# Patient Record
Sex: FEMALE | Race: WHITE | URBAN - NONMETROPOLITAN AREA
[De-identification: names, ages, dates, MRNs, and addresses within clinical notes are randomized per-mention and may not be internally consistent; named-entity substitution may affect disease eponyms.]

---

## 2022-08-14 ENCOUNTER — HOSPITAL ENCOUNTER (EMERGENCY)
Age: 37
Discharge: HOME | End: 2022-08-14
Attending: EMERGENCY MEDICINE
Payer: COMMERCIAL

## 2022-08-14 ENCOUNTER — APPOINTMENT (OUTPATIENT)
Dept: CT IMAGING | Age: 37
End: 2022-08-14
Payer: COMMERCIAL

## 2022-08-14 VITALS
BODY MASS INDEX: 32.78 KG/M2 | TEMPERATURE: 96.8 F | WEIGHT: 192 LBS | SYSTOLIC BLOOD PRESSURE: 145 MMHG | DIASTOLIC BLOOD PRESSURE: 75 MMHG | HEART RATE: 95 BPM | RESPIRATION RATE: 20 BRPM | HEIGHT: 64 IN | OXYGEN SATURATION: 98 %

## 2022-08-14 DIAGNOSIS — N93.9 ABNORMAL VAGINAL BLEEDING: ICD-10-CM

## 2022-08-14 DIAGNOSIS — N93.9 VAGINAL BLEEDING: Primary | ICD-10-CM

## 2022-08-14 LAB
ALBUMIN SERPL-MCNC: 4.5 GM/DL (ref 3.4–5)
ALP BLD-CCNC: 94 IU/L (ref 40–129)
ALT SERPL-CCNC: 19 U/L (ref 10–40)
ANION GAP SERPL CALCULATED.3IONS-SCNC: 11 MMOL/L (ref 4–16)
AST SERPL-CCNC: 19 IU/L (ref 15–37)
BACTERIA: NEGATIVE /HPF
BASOPHILS ABSOLUTE: 0 K/CU MM
BASOPHILS RELATIVE PERCENT: 0.3 % (ref 0–1)
BILIRUB SERPL-MCNC: 0.4 MG/DL (ref 0–1)
BILIRUBIN URINE: NEGATIVE MG/DL
BLOOD, URINE: ABNORMAL
BUN BLDV-MCNC: 13 MG/DL (ref 6–23)
CALCIUM SERPL-MCNC: 8.8 MG/DL (ref 8.3–10.6)
CAST TYPE: ABNORMAL /HPF
CHLORIDE BLD-SCNC: 105 MMOL/L (ref 99–110)
CLARITY: CLEAR
CO2: 23 MMOL/L (ref 21–32)
COLOR: YELLOW
CREAT SERPL-MCNC: 0.7 MG/DL (ref 0.6–1.1)
CRYSTAL TYPE: NEGATIVE /HPF
DIFFERENTIAL TYPE: ABNORMAL
EOSINOPHILS ABSOLUTE: 0.3 K/CU MM
EOSINOPHILS RELATIVE PERCENT: 2.4 % (ref 0–3)
EPITHELIAL CELLS, UA: 3 /HPF
GFR AFRICAN AMERICAN: >60 ML/MIN/1.73M2
GFR NON-AFRICAN AMERICAN: >60 ML/MIN/1.73M2
GLUCOSE BLD-MCNC: 141 MG/DL (ref 70–99)
GLUCOSE, URINE: NEGATIVE MG/DL
HCT VFR BLD CALC: 39.4 % (ref 37–47)
HEMOGLOBIN: 13.5 GM/DL (ref 12.5–16)
IMMATURE NEUTROPHIL %: 0.4 % (ref 0–0.43)
INR BLD: 1.01 INDEX
INTERPRETATION: NORMAL
KETONES, URINE: NEGATIVE MG/DL
LEUKOCYTE ESTERASE, URINE: NEGATIVE
LYMPHOCYTES ABSOLUTE: 1.9 K/CU MM
LYMPHOCYTES RELATIVE PERCENT: 16.6 % (ref 24–44)
MCH RBC QN AUTO: 29.2 PG (ref 27–31)
MCHC RBC AUTO-ENTMCNC: 34.3 % (ref 32–36)
MCV RBC AUTO: 85.1 FL (ref 78–100)
MONOCYTES ABSOLUTE: 0.8 K/CU MM
MONOCYTES RELATIVE PERCENT: 6.7 % (ref 0–4)
NITRITE URINE, QUANTITATIVE: NEGATIVE
PDW BLD-RTO: 12.5 % (ref 11.7–14.9)
PH, URINE: 5.5 (ref 5–8)
PLATELET # BLD: 411 K/CU MM (ref 140–440)
PMV BLD AUTO: 9.6 FL (ref 7.5–11.1)
POTASSIUM SERPL-SCNC: 4.1 MMOL/L (ref 3.5–5.1)
PREGNANCY, URINE: NEGATIVE
PROTEIN UA: NEGATIVE MG/DL
PROTHROMBIN TIME: 12.2 SECONDS (ref 11.7–14.5)
RBC # BLD: 4.63 M/CU MM (ref 4.2–5.4)
RBC URINE: 25 /HPF (ref 0–6)
SEGMENTED NEUTROPHILS ABSOLUTE COUNT: 8.3 K/CU MM
SEGMENTED NEUTROPHILS RELATIVE PERCENT: 73.6 % (ref 36–66)
SODIUM BLD-SCNC: 139 MMOL/L (ref 135–145)
SPECIFIC GRAVITY UA: >1.03 (ref 1–1.03)
SPECIFIC GRAVITY, URINE: >1.03 (ref 1–1.03)
TOTAL IMMATURE NEUTOROPHIL: 0.04 K/CU MM
TOTAL PROTEIN: 7.9 GM/DL (ref 6.4–8.2)
TSH HIGH SENSITIVITY: 0.62 UIU/ML (ref 0.27–4.2)
UROBILINOGEN, URINE: 0.2 MG/DL (ref 0.2–1)
WBC # BLD: 11.3 K/CU MM (ref 4–10.5)
WBC UA: NEGATIVE /HPF (ref 0–5)

## 2022-08-14 PROCEDURE — 6360000004 HC RX CONTRAST MEDICATION: Performed by: EMERGENCY MEDICINE

## 2022-08-14 PROCEDURE — 96374 THER/PROPH/DIAG INJ IV PUSH: CPT

## 2022-08-14 PROCEDURE — 85025 COMPLETE CBC W/AUTO DIFF WBC: CPT

## 2022-08-14 PROCEDURE — 6370000000 HC RX 637 (ALT 250 FOR IP): Performed by: EMERGENCY MEDICINE

## 2022-08-14 PROCEDURE — 81001 URINALYSIS AUTO W/SCOPE: CPT

## 2022-08-14 PROCEDURE — 81025 URINE PREGNANCY TEST: CPT

## 2022-08-14 PROCEDURE — 80053 COMPREHEN METABOLIC PANEL: CPT

## 2022-08-14 PROCEDURE — 74177 CT ABD & PELVIS W/CONTRAST: CPT

## 2022-08-14 PROCEDURE — 99285 EMERGENCY DEPT VISIT HI MDM: CPT

## 2022-08-14 PROCEDURE — 6360000002 HC RX W HCPCS: Performed by: EMERGENCY MEDICINE

## 2022-08-14 PROCEDURE — 84443 ASSAY THYROID STIM HORMONE: CPT

## 2022-08-14 PROCEDURE — 85610 PROTHROMBIN TIME: CPT

## 2022-08-14 RX ORDER — ONDANSETRON 2 MG/ML
4 INJECTION INTRAMUSCULAR; INTRAVENOUS ONCE
Status: COMPLETED | OUTPATIENT
Start: 2022-08-14 | End: 2022-08-14

## 2022-08-14 RX ORDER — TRANEXAMIC ACID 650 1/1
1300 TABLET ORAL 3 TIMES DAILY
Qty: 30 TABLET | Refills: 0 | Status: SHIPPED | OUTPATIENT
Start: 2022-08-14 | End: 2022-09-15

## 2022-08-14 RX ORDER — ONDANSETRON 4 MG/1
4 TABLET, FILM COATED ORAL 3 TIMES DAILY PRN
Qty: 15 TABLET | Refills: 0 | Status: SHIPPED | OUTPATIENT
Start: 2022-08-14

## 2022-08-14 RX ORDER — TRANEXAMIC ACID 650 1/1
1300 TABLET ORAL ONCE
Status: COMPLETED | OUTPATIENT
Start: 2022-08-14 | End: 2022-08-14

## 2022-08-14 RX ADMIN — TRANEXAMIC ACID 1300 MG: 650 TABLET ORAL at 13:56

## 2022-08-14 RX ADMIN — IOPAMIDOL 100 ML: 755 INJECTION, SOLUTION INTRAVENOUS at 12:09

## 2022-08-14 RX ADMIN — ONDANSETRON 4 MG: 2 INJECTION INTRAMUSCULAR; INTRAVENOUS at 11:13

## 2022-08-14 ASSESSMENT — PAIN DESCRIPTION - DESCRIPTORS: DESCRIPTORS: DISCOMFORT;CRAMPING

## 2022-08-14 ASSESSMENT — LIFESTYLE VARIABLES
HOW OFTEN DO YOU HAVE A DRINK CONTAINING ALCOHOL: NEVER
HOW MANY STANDARD DRINKS CONTAINING ALCOHOL DO YOU HAVE ON A TYPICAL DAY: PATIENT DOES NOT DRINK

## 2022-08-14 ASSESSMENT — PAIN DESCRIPTION - LOCATION: LOCATION: ABDOMEN

## 2022-08-14 ASSESSMENT — PAIN SCALES - GENERAL: PAINLEVEL_OUTOF10: 10

## 2022-08-14 ASSESSMENT — PAIN DESCRIPTION - ORIENTATION: ORIENTATION: LOWER;MID

## 2022-08-14 ASSESSMENT — PAIN - FUNCTIONAL ASSESSMENT: PAIN_FUNCTIONAL_ASSESSMENT: 0-10

## 2022-08-14 ASSESSMENT — PAIN DESCRIPTION - PAIN TYPE: TYPE: ACUTE PAIN

## 2022-08-14 NOTE — ED NOTES
Discharge instructions reviewed with patient and patients . Reviewed prescriptions with patient and patients . No additional questions asked. Voiced understanding. Encouraged patient to follow up as discussed by the ED physician.      Willie Hurtado RN  08/14/22 7705

## 2022-08-14 NOTE — Clinical Note
Jamila Iyer was seen and treated in our emergency department on 8/14/2022. She may return to work on 08/16/2022. If you have any questions or concerns, please don't hesitate to call.       Mariama Orta MD

## 2022-08-14 NOTE — ED PROVIDER NOTES
Emergency [address] [facility] EMERGENCY DEPARTMENT    Patient: Jamison Pruitt  MRN: [id number]  : 1985  Date of Evaluation: 2022  ED Provider: Michael Loera MD    Chief Complaint       Chief Complaint   Patient presents with    Vaginal Bleeding     C/o heavy vaginal bleeding for 5 days w/ abd cramps. Pt has had abnormal periods for a year     Susanna Henriquez is a 31 y.o. female who presents to the emergency department for evaluation of abnormal uterine bleeding. Patient reports that she has been having abnormal periods for the past several years but worsened over the past 2 years. She says that it intermittently goes for very light menstrual cycles to very heavy menstrual cycles that require changing of the pad or tampon every hour. Patient states that heavy menstrual cycles are associated with significant mount of cramping and discomfort. Today she says she also felt nauseated and lightheaded and became concerned. Her last menstrual cycle started several days ago. Does report that she is been seen and evaluated by multiple physicians and OB/GYN's for the same. She states that she is frequently placed on birth control medications for her symptoms but states that they have not seem to regulate her menstrual cycles. Patient does report that they have checked her thyroid as well as other laboratory test with no clear-cut etiology of her symptoms as of yet. She states that she feels that oral hormonal medications do not seem to be working so she states that she has not currently taking any at this time. Does report that she has been taking Midol, ibuprofen, Tylenol for her menstrual cramping. Denies having history of having a bleeding disorder although she says that she does tend to bleed easily even with intramuscular injections or minor cuts. No reported family history of bleeding disorders of which she is aware.   Denies taking any other medications on a regular basis. No abdominal surgeries or trauma. Patient reports that she had last seen an OB/GYN in [facility] and does not have a local physician. ROS:     At least 10 systems reviewed and otherwise acutely negative except as in the [address] Past History   History reviewed. No pertinent past medical history. History reviewed. No pertinent surgical history. Social History     Socioeconomic History    Marital status:      Spouse name: None    Number of children: None    Years of education: None    Highest education level: None   Tobacco Use    Smoking status: Never    Smokeless tobacco: Never   Vaping Use    Vaping Use: Never used   Substance and Sexual Activity    Alcohol use: Not Currently    Sexual activity: Yes     Partners: Male       Medications/Allergies     Previous Medications    No medications on file     Allergies   Allergen Reactions    Pcn [Penicillins] Rash        Physical Exam       ED Triage Vitals [08/14/22 1044]   BP Temp Temp Source Heart Rate Resp SpO2 Height Weight   (!) 145/75 96.8 °F (36 °C) Oral 95 20 98 % 5' 4\" (1.626 m) 192 lb (87.1 kg)     GENERAL APPEARANCE: Awake and alert. Cooperative. No acute distress. HEAD: Normocephalic. Atraumatic. EYES: Sclera anicteric. Pupils equal round reactive to light extraocular movements are intact  ENT: Tolerates saliva. No trismus. Moist mucous membranes  NECK: Supple. Trachea midline. No meningismus  CARDIO: RRR. Radial pulse 2+. No murmurs rubs or gallops appreciated  LUNGS: Respirations unlabored. CTAB. No accessory muscle usage noted. No wheezes rales rhonchi or stridor. ABDOMEN: Soft. Non-distended. Non-tender. No tenderness in right upper quadrant or right lower quadrant to deep palpation  EXTREMITIES: No acute deformities. No unilateral leg swelling or tenderness behind either one of calves  SKIN: Warm and dry. No erythema edema or rashes appreciated  NEUROLOGICAL:  Cranial nerves II through XII grossly intact.  No gross facial drooping. Moves all 4 extremities spontaneously. PSYCHIATRIC: Normal mood. Alert and oriented x3. No reported active suicidality or homicidality.     Diagnostics   Labs:  Results for orders placed or performed during the hospital encounter of 08/14/22   CBC with Auto Differential   Result Value Ref Range    WBC 11.3 (H) 4.0 - 10.5 K/CU MM    RBC 4.63 4.2 - 5.4 M/CU MM    Hemoglobin 13.5 12.5 - 16.0 GM/DL    Hematocrit 39.4 37 - 47 %    MCV 85.1 78 - 100 FL    MCH 29.2 27 - 31 PG    MCHC 34.3 32.0 - 36.0 %    RDW 12.5 11.7 - 14.9 %    Platelets 782 453 - 437 K/CU MM    MPV 9.6 7.5 - 11.1 FL    Differential Type AUTOMATED DIFFERENTIAL     Segs Relative 73.6 (H) 36 - 66 %    Lymphocytes % 16.6 (L) 24 - 44 %    Monocytes % 6.7 (H) 0 - 4 %    Eosinophils % 2.4 0 - 3 %    Basophils % 0.3 0 - 1 %    Segs Absolute 8.3 K/CU MM    Lymphocytes Absolute 1.9 K/CU MM    Monocytes Absolute 0.8 K/CU MM    Eosinophils Absolute 0.3 K/CU MM    Basophils Absolute 0.0 K/CU MM    Immature Neutrophil % 0.4 0 - 0.43 %    Total Immature Neutrophil 0.04 K/CU MM   Comprehensive Metabolic Panel w/ Reflex to MG   Result Value Ref Range    Sodium 139 135 - 145 MMOL/L    Potassium 4.1 3.5 - 5.1 MMOL/L    Chloride 105 99 - 110 mMol/L    CO2 23 21 - 32 MMOL/L    BUN 13 6 - 23 MG/DL    Creatinine 0.7 0.6 - 1.1 MG/DL    Glucose 141 (H) 70 - 99 MG/DL    Calcium 8.8 8.3 - 10.6 MG/DL    Albumin 4.5 3.4 - 5.0 GM/DL    Total Protein 7.9 6.4 - 8.2 GM/DL    Total Bilirubin 0.4 0.0 - 1.0 MG/DL    ALT 19 10 - 40 U/L    AST 19 15 - 37 IU/L    Alkaline Phosphatase 94 40 - 129 IU/L    GFR Non-African American >60 >60 mL/min/1.73m2    GFR African American >60 >60 mL/min/1.73m2    Anion Gap 11 4 - 16   Protime-INR   Result Value Ref Range    Protime 12.2 11.7 - 14.5 SECONDS    INR 1.01 INDEX   Urinalysis with Microscopic   Result Value Ref Range    Color, UA YELLOW YELLOW    Clarity, UA CLEAR CLEAR    Glucose, Urine NEGATIVE NEGATIVE MG/DL    Bilirubin Urine NEGATIVE NEGATIVE MG/DL    Ketones, Urine NEGATIVE NEGATIVE MG/DL    Specific Gravity, UA >1.030 1.001 - 1.035    Blood, Urine LARGE NUMBER OR AMOUNT OF  (A) NEGATIVE    pH, Urine 5.5 5.0 - 8.0    Protein, UA NEGATIVE NEGATIVE MG/DL    Urobilinogen, Urine 0.2 0.2 - 1.0 MG/DL    Nitrite Urine, Quantitative NEGATIVE NEGATIVE    Leukocyte Esterase, Urine NEGATIVE NEGATIVE    RBC, UA 25 (H) 0 - 6 /HPF    WBC, UA NEGATIVE 0 - 5 /HPF    Epithelial Cells, UA 3 /HPF    Cast Type NO CAST FORMS SEEN NO CAST FORMS SEEN /HPF    Bacteria, UA NEGATIVE NEGATIVE /HPF    Crystal Type NEGATIVE NEGATIVE /HPF   Pregnancy, Urine   Result Value Ref Range    Pregnancy, Urine NEGATIVE NEGATIVE    Specific Gravity, Urine >1.030 1.001 - 1.035    Interpretation HCG METHOD LIMITATIONS:    TSH   Result Value Ref Range    TSH, High Sensitivity 0.620 0.270 - 4.20 uIu/ml     Radiographs:  CT ABDOMEN PELVIS W IV CONTRAST Additional Contrast? None    Result Date: 8/14/2022  1. No acute abdominal process. 2. The pelvis is normal with normal appearance of the uterus and ovaries. Procedures/EKG:       ED Course and MDM   In brief, Jada Curry is a 31 y.o. female who presented to the emergency department for evaluation of abnormal uterine bleeding. Based on patient's history and physical would be concern for possible underlying bleeding disorder of the possibility patient symptoms do include uterine fibroids. Patient denies unexplained weight loss or weight gain making cancers less likely. No reported history of early gynecologic cancers of which she is aware. Patient denies any vaginal discharge or bleeding between cycles fevers or new sexual partners. Do believe that pelvic inflammatory disease or other infectious etiology less likely as well. On reevaluation patient is resting much more comfortably at this time. Does report that her nausea is completely resolved. She is feeling better after pharmacologic intervention.   I discussed the findings of the laboratory work with her and advised her that we were going to obtain a CT scan to look for any gross structural abnormalities. Does report that her mother did have a hysterectomy for cancer but that was performed when her mother was in her 62s. I did review patient's imaging studies and laboratory work as noted above. I discussed the case with Dr. Sawyer Cartagena, OB/GYN. He recommended giving the patient 25 mg of Premarin as well as 1300 mg of TXA 2 tablets 3 times a day for 5 days. Requested the patient call his office in the morning  Patient will be seen as soon as possible. These recommendations were discussed with the patient. She is very happy to hear this and appreciative of our efforts. She does feel comfortable to be discharged home    Unfortunately Premarin is not available at this facility. I did offer to have this medication transferred from [facility]. Patient was advised of the time that it might take to get this medication and she says that she would prefer not to wait and will to start TXA at this time and prefers to go home. She will call OB/GYN first thing tomorrow morning. ED Medication Orders (From admission, onward)      Start Ordered     Status Ordering Provider    08/14/22 1345 08/14/22 1335  tranexamic acid (LYSTEDA) tablet 1,300 mg  ONCE         Acknowledged ANYI MUELLER    08/14/22 1315 08/14/22 1310  conjugated estrogens (PREMARIN) injection 25 mg  ONCE         Acknowledged ANYI MUELLER    08/14/22 1208 08/14/22 1208  iopamidol (ISOVUE-370) 76 % injection 100 mL  IMG ONCE PRN         Last MAR action: Given - by [facility] MARIEE on 08/14/22 at [address], ANYI    08/14/22 1100 08/14/22 1059  ondansetron (ZOFRAN) injection 4 mg  ONCE         Last MAR action: Given - by Nj Anthony on 08/14/22 at 1113 ANYI MUELLER            Final Impression      1. Vaginal bleeding    2.  Abnormal vaginal bleeding      DISPOSITION Discharge - Pending Orders Complete 08/14/2022 01:40:14 PM         (Please note that portions of this note may have been completed with a voice recognition program. Efforts were made to edit the dictations but occasionally words are mis-transcribed.)    Juan Summers MD  [facility]        Juan Summers MD  08/14/22 [facility] [place]  08/14/22 1424

## 2022-08-14 NOTE — DISCHARGE INSTRUCTIONS
Please follow-up with OB/GYN tomorrow morning. Call to schedule appointment. Drink plenty of fluids.     Return to the emergency department for increasing pain, fevers, increasing bleeding, inability tolerating by mouth, any other concerning symptoms

## 2022-08-15 NOTE — PROGRESS NOTES
Was seen in ER and ER physician called me and she needs follow up due to heavy menstrual bleeding. Please schedule this week.   This can be with myself, Erin Rdz

## 2022-09-15 ENCOUNTER — OFFICE VISIT (OUTPATIENT)
Dept: FAMILY MEDICINE CLINIC | Age: 37
End: 2022-09-15
Payer: COMMERCIAL

## 2022-09-15 VITALS
SYSTOLIC BLOOD PRESSURE: 118 MMHG | RESPIRATION RATE: 16 BRPM | TEMPERATURE: 97.7 F | WEIGHT: 203.8 LBS | HEART RATE: 94 BPM | BODY MASS INDEX: 34.98 KG/M2 | DIASTOLIC BLOOD PRESSURE: 76 MMHG | OXYGEN SATURATION: 97 %

## 2022-09-15 DIAGNOSIS — G47.9 SLEEP DIFFICULTIES: ICD-10-CM

## 2022-09-15 DIAGNOSIS — N92.6 IRREGULAR MENSES: Primary | ICD-10-CM

## 2022-09-15 PROCEDURE — G8417 CALC BMI ABV UP PARAM F/U: HCPCS | Performed by: NURSE PRACTITIONER

## 2022-09-15 PROCEDURE — G8427 DOCREV CUR MEDS BY ELIG CLIN: HCPCS | Performed by: NURSE PRACTITIONER

## 2022-09-15 PROCEDURE — 1036F TOBACCO NON-USER: CPT | Performed by: NURSE PRACTITIONER

## 2022-09-15 PROCEDURE — 99203 OFFICE O/P NEW LOW 30 MIN: CPT | Performed by: NURSE PRACTITIONER

## 2022-09-15 RX ORDER — PROGESTERONE 200 MG/1
200 CAPSULE ORAL NIGHTLY
COMMUNITY

## 2022-09-15 ASSESSMENT — ANXIETY QUESTIONNAIRES
6. BECOMING EASILY ANNOYED OR IRRITABLE: 0
3. WORRYING TOO MUCH ABOUT DIFFERENT THINGS: 1
7. FEELING AFRAID AS IF SOMETHING AWFUL MIGHT HAPPEN: 0
4. TROUBLE RELAXING: 0
5. BEING SO RESTLESS THAT IT IS HARD TO SIT STILL: 2
2. NOT BEING ABLE TO STOP OR CONTROL WORRYING: 0
1. FEELING NERVOUS, ANXIOUS, OR ON EDGE: 0
GAD7 TOTAL SCORE: 3

## 2022-09-15 ASSESSMENT — PATIENT HEALTH QUESTIONNAIRE - PHQ9
SUM OF ALL RESPONSES TO PHQ QUESTIONS 1-9: 0
SUM OF ALL RESPONSES TO PHQ QUESTIONS 1-9: 0
2. FEELING DOWN, DEPRESSED OR HOPELESS: 0
SUM OF ALL RESPONSES TO PHQ QUESTIONS 1-9: 0
SUM OF ALL RESPONSES TO PHQ QUESTIONS 1-9: 0
SUM OF ALL RESPONSES TO PHQ9 QUESTIONS 1 & 2: 0
1. LITTLE INTEREST OR PLEASURE IN DOING THINGS: 0

## 2022-09-15 ASSESSMENT — ENCOUNTER SYMPTOMS
WHEEZING: 0
NAUSEA: 0
ABDOMINAL PAIN: 0
VOMITING: 0
CONSTIPATION: 0
DIARRHEA: 0
COUGH: 0
SHORTNESS OF BREATH: 0
CHEST TIGHTNESS: 0

## 2022-09-15 NOTE — PROGRESS NOTES
SUBJECTIVE:    Elijah Moys  1985  30 y.o.  female      Chief Complaint   Patient presents with    New Patient     To establish care . Has been having weight gain no matter what she does to lose weight. She is going for labs next week was ordered by her OB /GYN     HPI    Periods became irregular a couple years ago. They were heavy at the time. She did not have a period for 6 months. Went to her OB/GYN in [place]--'put me on hormone pills'. She has since moved to the area. She saw a new OB/GYN two weeks ago. She is supposed to have labs completed. Complains of issues with weight gain. Does not feel she eats much junk. She has custody of a 3 yo and remains active. She has a [email or web address] exercise video that she couple times a week for 20 minutes. Complains of issues sleeping. Will sleep 3-4 hours per night. Not napping. Difficulty falling asleep. Able to stay asleep. 1 yo waking her in the morning. Sleeps with the TV on. She will go to bed around 10-11 pm, but not able to fall asleep until 3-4 in the morning. She has tried melatonin, but 'can't wake up on that'. She will occasionally take half a benadryl. Allergies   Allergen Reactions    Pcn [Penicillins] Anaphylaxis       Current Outpatient Medications   Medication Sig Dispense Refill    progesterone (PROMETRIUM) 200 MG CAPS capsule Take 200 mg by mouth nightly Takes the 1st of month for 14 days. ondansetron (ZOFRAN) 4 MG tablet Take 1 tablet by mouth 3 times daily as needed for Nausea or Vomiting 15 tablet 0     No current facility-administered medications for this visit.           Past Medical History:   Diagnosis Date    Anxiety      Past Surgical History:   Procedure Laterality Date    BUNIONECTOMY Right     6 years ago     Social History     Socioeconomic History    Marital status:      Spouse name: None    Number of children: None    Years of education: None    Highest education level: None   Tobacco Use    Smoking status: Never Smokeless tobacco: Never   Vaping Use    Vaping Use: Every day    Substances: Nicotine    Devices: Refillable tank    Passive vaping exposure: Yes   Substance and Sexual Activity    Alcohol use: Not Currently    Drug use: Never    Sexual activity: Yes     Partners: Male         No problem-specific Assessment & Plan notes found for this encounter. Review of Systems   Constitutional:  Negative for appetite change, chills, fatigue and unexpected weight change. Respiratory:  Negative for cough, chest tightness, shortness of breath and wheezing. Cardiovascular:  Negative for chest pain, palpitations and leg swelling. Gastrointestinal:  Negative for abdominal pain, constipation, diarrhea, nausea and vomiting. Genitourinary:  Positive for menstrual problem. Musculoskeletal:  Negative for arthralgias. Neurological:  Negative for weakness, numbness and headaches. Hematological:  Negative for adenopathy. Psychiatric/Behavioral:  Positive for sleep disturbance. The patient is not nervous/anxious. OBJECTIVE:    /76   Pulse 94   Temp 97.7 °F (36.5 °C) (Infrared)   Resp 16   Wt 203 lb 12.8 oz (92.4 kg)   LMP 08/15/2022   SpO2 97%   BMI 34.98 kg/m²     Physical Exam  Constitutional:       Appearance: Normal appearance. She is well-developed. HENT:      Head: Normocephalic and atraumatic. Right Ear: Hearing normal.      Left Ear: Hearing normal.   Neck:      Thyroid: No thyromegaly. Cardiovascular:      Rate and Rhythm: Normal rate and regular rhythm. Heart sounds: Normal heart sounds. No murmur heard. No friction rub. No gallop. Pulmonary:      Effort: Pulmonary effort is normal.      Breath sounds: Normal breath sounds. No wheezing, rhonchi or rales. Abdominal:      General: Bowel sounds are normal. There is no distension. Palpations: Abdomen is soft. Tenderness: There is no abdominal tenderness. There is no guarding.    Musculoskeletal:         General: Normal range of motion. Cervical back: Normal range of motion and neck supple. Right lower leg: No edema. Left lower leg: No edema. Skin:     General: Skin is warm and dry. Neurological:      General: No focal deficit present. Mental Status: She is alert and oriented to person, place, and time. Psychiatric:         Mood and Affect: Mood normal.         Behavior: Behavior normal. Behavior is cooperative. Thought Content: Thought content normal.       ASSESSMENT/PLAN:    1. BMI 34.0-34.9,adult  Referred to dietitian. The patient is asked to make an attempt to improve diet and exercise patterns to aid in medical management of this problem. - Amb Referral to Nutrition Services    2. Irregular menses  Advised to drop off lab results after completed for OB/GYN    3. Sleep difficulties  Discussed melatonin and dose titration. Discussed sleep hygiene and regular physical activity. Return in one month for recheck             Return in about 4 weeks (around 10/13/2022).       (Please note that portions of this note may have been completed with a voice recognition program. Efforts were made to edit the dictations but occasionally words aremis-transcribed.)

## 2022-10-19 ENCOUNTER — OFFICE VISIT (OUTPATIENT)
Dept: FAMILY MEDICINE CLINIC | Age: 37
End: 2022-10-19
Payer: COMMERCIAL

## 2022-10-19 ENCOUNTER — TELEPHONE (OUTPATIENT)
Dept: FAMILY MEDICINE CLINIC | Age: 37
End: 2022-10-19

## 2022-10-19 VITALS
HEART RATE: 92 BPM | WEIGHT: 206.8 LBS | RESPIRATION RATE: 16 BRPM | OXYGEN SATURATION: 98 % | TEMPERATURE: 97 F | SYSTOLIC BLOOD PRESSURE: 130 MMHG | BODY MASS INDEX: 35.5 KG/M2 | DIASTOLIC BLOOD PRESSURE: 80 MMHG

## 2022-10-19 DIAGNOSIS — F41.9 ANXIETY: ICD-10-CM

## 2022-10-19 DIAGNOSIS — F33.2 SEVERE EPISODE OF RECURRENT MAJOR DEPRESSIVE DISORDER, WITHOUT PSYCHOTIC FEATURES (HCC): Primary | ICD-10-CM

## 2022-10-19 DIAGNOSIS — L65.9 HAIR LOSS: ICD-10-CM

## 2022-10-19 PROCEDURE — 1036F TOBACCO NON-USER: CPT | Performed by: NURSE PRACTITIONER

## 2022-10-19 PROCEDURE — G8427 DOCREV CUR MEDS BY ELIG CLIN: HCPCS | Performed by: NURSE PRACTITIONER

## 2022-10-19 PROCEDURE — G8417 CALC BMI ABV UP PARAM F/U: HCPCS | Performed by: NURSE PRACTITIONER

## 2022-10-19 PROCEDURE — 99214 OFFICE O/P EST MOD 30 MIN: CPT | Performed by: NURSE PRACTITIONER

## 2022-10-19 PROCEDURE — G8484 FLU IMMUNIZE NO ADMIN: HCPCS | Performed by: NURSE PRACTITIONER

## 2022-10-19 RX ORDER — SERTRALINE HYDROCHLORIDE 25 MG/1
25 TABLET, FILM COATED ORAL DAILY
Qty: 30 TABLET | Refills: 3 | Status: SHIPPED | OUTPATIENT
Start: 2022-10-19

## 2022-10-19 ASSESSMENT — ANXIETY QUESTIONNAIRES
1. FEELING NERVOUS, ANXIOUS, OR ON EDGE: 3
5. BEING SO RESTLESS THAT IT IS HARD TO SIT STILL: 2
4. TROUBLE RELAXING: 1
6. BECOMING EASILY ANNOYED OR IRRITABLE: 2
GAD7 TOTAL SCORE: 13
3. WORRYING TOO MUCH ABOUT DIFFERENT THINGS: 3
7. FEELING AFRAID AS IF SOMETHING AWFUL MIGHT HAPPEN: 1
2. NOT BEING ABLE TO STOP OR CONTROL WORRYING: 1

## 2022-10-19 ASSESSMENT — PATIENT HEALTH QUESTIONNAIRE - PHQ9
SUM OF ALL RESPONSES TO PHQ9 QUESTIONS 1 & 2: 6
SUM OF ALL RESPONSES TO PHQ QUESTIONS 1-9: 21
2. FEELING DOWN, DEPRESSED OR HOPELESS: 3
10. IF YOU CHECKED OFF ANY PROBLEMS, HOW DIFFICULT HAVE THESE PROBLEMS MADE IT FOR YOU TO DO YOUR WORK, TAKE CARE OF THINGS AT HOME, OR GET ALONG WITH OTHER PEOPLE: 2
SUM OF ALL RESPONSES TO PHQ QUESTIONS 1-9: 21
7. TROUBLE CONCENTRATING ON THINGS, SUCH AS READING THE NEWSPAPER OR WATCHING TELEVISION: 0
8. MOVING OR SPEAKING SO SLOWLY THAT OTHER PEOPLE COULD HAVE NOTICED. OR THE OPPOSITE, BEING SO FIGETY OR RESTLESS THAT YOU HAVE BEEN MOVING AROUND A LOT MORE THAN USUAL: 3
5. POOR APPETITE OR OVEREATING: 3
3. TROUBLE FALLING OR STAYING ASLEEP: 3
SUM OF ALL RESPONSES TO PHQ QUESTIONS 1-9: 21
1. LITTLE INTEREST OR PLEASURE IN DOING THINGS: 3
SUM OF ALL RESPONSES TO PHQ QUESTIONS 1-9: 21
9. THOUGHTS THAT YOU WOULD BE BETTER OFF DEAD, OR OF HURTING YOURSELF: 0
6. FEELING BAD ABOUT YOURSELF - OR THAT YOU ARE A FAILURE OR HAVE LET YOURSELF OR YOUR FAMILY DOWN: 3
4. FEELING TIRED OR HAVING LITTLE ENERGY: 3

## 2022-10-19 ASSESSMENT — ENCOUNTER SYMPTOMS
SHORTNESS OF BREATH: 0
DIARRHEA: 0
ABDOMINAL PAIN: 0
COUGH: 0
WHEEZING: 0
NAUSEA: 0
CONSTIPATION: 0
VOMITING: 0
CHEST TIGHTNESS: 0

## 2022-10-19 ASSESSMENT — COLUMBIA-SUICIDE SEVERITY RATING SCALE - C-SSRS
2. HAVE YOU ACTUALLY HAD ANY THOUGHTS OF KILLING YOURSELF?: NO
1. WITHIN THE PAST MONTH, HAVE YOU WISHED YOU WERE DEAD OR WISHED YOU COULD GO TO SLEEP AND NOT WAKE UP?: NO
6. HAVE YOU EVER DONE ANYTHING, STARTED TO DO ANYTHING, OR PREPARED TO DO ANYTHING TO END YOUR LIFE?: NO

## 2022-10-19 NOTE — ASSESSMENT & PLAN NOTE
Concerned with weight. 'take walks'. Not currently walking in the last week. Does not eat out frequently. Cooking at home for the most part. Admits to snacking. Admits to eating late at night.

## 2022-10-19 NOTE — TELEPHONE ENCOUNTER
Called Dr. Niko Gutierrez OB/GYN office and spoke with .  Puma Abel that Patient had labs completed by their office and her PCP Abhijeet FLORES would like copies faxed to this office for patients chart / review.   Dr. Renato Martinez  office to fax lab results to this office [phone]

## 2022-10-19 NOTE — ASSESSMENT & PLAN NOTE
Complains of thinning hair over the last few years. She has tried vitamins, shampoos, rogaine. She has not seen dermatology. She had labs completed by OB/GYN that she is not able to view during appointment.

## 2022-10-19 NOTE — ASSESSMENT & PLAN NOTE
Depression worsening over the last couple months. Temporary custody of 1year old. Possibility of full custody in December. Not sleeping well--will sleep late into the morning after not falling asleep around 2-3 am. Denies SI/HI. She has been on prozac in the past for depression.    PHQ Scores 10/19/2022 9/15/2022   PHQ2 Score 6 0   PHQ9 Score 21 0     Interpretation of Total Score Depression Severity: 1-4 = Minimal depression, 5-9 = Mild depression, 10-14 = Moderate depression, 15-19 = Moderately severe depression, 20-27 = Severe depression

## 2022-10-19 NOTE — PROGRESS NOTES
SUBJECTIVE:    Debi Craven  1985  30 y.o.  female      Chief Complaint   Patient presents with    Follow-up     To discuss lab. Her hair continues to fall out and don't know why . Flu Vaccine     Declined flu vaccine at this time      HPI      Allergies   Allergen Reactions    Pcn [Penicillins] Anaphylaxis       Current Outpatient Medications   Medication Sig Dispense Refill    sertraline (ZOLOFT) 25 MG tablet Take 1 tablet by mouth daily 30 tablet 3    progesterone (PROMETRIUM) 200 MG CAPS capsule Take 200 mg by mouth nightly Takes the 1st of month for 14 days. ondansetron (ZOFRAN) 4 MG tablet Take 1 tablet by mouth 3 times daily as needed for Nausea or Vomiting 15 tablet 0     No current facility-administered medications for this visit. Past Medical History:   Diagnosis Date    Anxiety      Past Surgical History:   Procedure Laterality Date    BUNIONECTOMY Right     6 years ago     Social History     Socioeconomic History    Marital status:      Spouse name: None    Number of children: None    Years of education: None    Highest education level: None   Tobacco Use    Smoking status: Never    Smokeless tobacco: Never   Vaping Use    Vaping Use: Every day    Substances: Nicotine    Devices: Refillable tank    Passive vaping exposure: Yes   Substance and Sexual Activity    Alcohol use: Not Currently    Drug use: Never    Sexual activity: Yes     Partners: Male     OB/GYN [facility] labs. Does not have copy. Believes her A1c was prediabetic. Unsure of other results. Hair loss  Complains of thinning hair over the last few years. She has tried vitamins, shampoos, rogaine. She has not seen dermatology. She had labs completed by OB/GYN that she is not able to view during appointment. BMI 34.0-34.9,adult  Concerned with weight. 'take walks'. Not currently walking in the last week. Does not eat out frequently. Cooking at home for the most part. Admits to snacking.  Admits to eating late at night.      Severe episode of recurrent major depressive disorder, without psychotic features ([address]  Depression worsening over the last couple months. Temporary custody of 1year old. Possibility of full custody in December. Not sleeping well--will sleep late into the morning after not falling asleep around 2-3 am. Denies SI/HI. She has been on prozac in the past for depression. PHQ Scores 10/19/2022 9/15/2022   PHQ2 Score 6 0   PHQ9 Score 21 0     Interpretation of Total Score Depression Severity: 1-4 = Minimal depression, 5-9 = Mild depression, 10-14 = Moderate depression, 15-19 = Moderately severe depression, 20-27 = Severe depression       Anxiety  Problems falling asleep. Then will sleep late. Has temporary custody of 2 yo. Increased anxiety. ARLINE-7 SCREENING 10/19/2022 9/15/2022   Feeling nervous, anxious, or on edge Nearly every day Not at all   Not being able to stop or control worrying Several days Not at all   Worrying too much about different things Nearly every day Several days   Trouble relaxing Several days Not at all   Being so restless that it is hard to sit still More than half the days More than half the days   Becoming easily annoyed or irritable More than half the days Not at all   Feeling afraid as if something awful might happen Several days Not at all   ARLINE-7 Total Score 13 3           Review of Systems   Constitutional:  Negative for appetite change, chills, fatigue and unexpected weight change. Respiratory:  Negative for cough, chest tightness, shortness of breath and wheezing. Cardiovascular:  Negative for chest pain, palpitations and leg swelling. Gastrointestinal:  Negative for abdominal pain, constipation, diarrhea, nausea and vomiting. Endocrine: Positive for heat intolerance. Negative for cold intolerance. Musculoskeletal:  Negative for arthralgias. Neurological:  Negative for weakness, numbness and headaches. Hematological:  Negative for adenopathy. Psychiatric/Behavioral:  Positive for dysphoric mood and sleep disturbance. The patient is nervous/anxious. OBJECTIVE:    /80 (Site: Left Upper Arm, Position: Sitting, Cuff Size: Medium Adult)   Pulse 92   Temp 97 °F (36.1 °C) (Infrared)   Resp 16   Wt 206 lb 12.8 oz (93.8 kg)   LMP 10/17/2022   SpO2 98%   BMI 35.50 kg/m²     Physical Exam  Constitutional:       Appearance: Normal appearance. She is well-developed. HENT:      Head: Normocephalic and atraumatic. Right Ear: Hearing normal.      Left Ear: Hearing normal.      Nose: Nose normal.      Mouth/Throat:      Mouth: Mucous membranes are moist.   Cardiovascular:      Rate and Rhythm: Normal rate and regular rhythm. Heart sounds: Normal heart sounds. No murmur heard. No friction rub. No gallop. Pulmonary:      Effort: Pulmonary effort is normal.      Breath sounds: Normal breath sounds. No wheezing, rhonchi or rales. Abdominal:      Palpations: Abdomen is soft. Musculoskeletal:         General: Normal range of motion. Cervical back: Normal range of motion and neck supple. Skin:     General: Skin is warm and dry. Comments: Thinning hair     Neurological:      General: No focal deficit present. Mental Status: She is alert and oriented to person, place, and time. Psychiatric:         Mood and Affect: Mood is anxious and depressed. Behavior: Behavior normal. Behavior is cooperative. Thought Content: Thought content normal. Thought content does not include homicidal or suicidal ideation. Thought content does not include homicidal or suicidal plan. ASSESSMENT/PLAN:    1. Hair loss  Will try to obtain labs from OB/GYN. Thyroid and vitamin D if not completed. Referred to dermatology for further evaluation  - [facility] - Bharti Rush MD, Dermatology, [place]  - TSH with Reflex; Future  - Vitamin D 25 Hydroxy; Future    2. Anxiety  Start zoloft. Regular physical activity. Limit caffeine.  Sleep hygiene. Follow up in one month  - sertraline (ZOLOFT) 25 MG tablet; Take 1 tablet by mouth daily  Dispense: 30 tablet; Refill: 3    3. Severe episode of recurrent major depressive disorder, without psychotic features ([address]  Start zoloft. Follow up in one month  - sertraline (ZOLOFT) 25 MG tablet; Take 1 tablet by mouth daily  Dispense: 30 tablet; Refill: 3    4. BMI 34.0-34.9,adult  The patient is asked to make an attempt to improve diet and exercise patterns to aid in medical management of this problem. Return in about 4 weeks (around 11/16/2022).       (Please note that portions of this note may have been completed with a voice recognition program. Efforts were made to edit the dictations but occasionally words aremis-transcribed.)

## 2022-10-20 DIAGNOSIS — L65.9 HAIR LOSS: Primary | ICD-10-CM

## 2022-10-24 DIAGNOSIS — L65.9 HAIR LOSS: ICD-10-CM

## 2022-10-24 LAB — VITAMIN D 25-HYDROXY: 32.2 NG/ML

## 2022-10-25 LAB — TSH REFLEX: 0.38 UIU/ML (ref 0.27–4.2)

## 2022-12-01 ENCOUNTER — OFFICE VISIT (OUTPATIENT)
Dept: FAMILY MEDICINE CLINIC | Age: 37
End: 2022-12-01
Payer: COMMERCIAL

## 2022-12-01 VITALS
HEART RATE: 100 BPM | BODY MASS INDEX: 34.3 KG/M2 | TEMPERATURE: 97.4 F | WEIGHT: 199.8 LBS | SYSTOLIC BLOOD PRESSURE: 132 MMHG | RESPIRATION RATE: 18 BRPM | OXYGEN SATURATION: 98 % | DIASTOLIC BLOOD PRESSURE: 80 MMHG

## 2022-12-01 DIAGNOSIS — F33.2 SEVERE EPISODE OF RECURRENT MAJOR DEPRESSIVE DISORDER, WITHOUT PSYCHOTIC FEATURES (HCC): Primary | ICD-10-CM

## 2022-12-01 DIAGNOSIS — Z13.1 SCREENING FOR DIABETES MELLITUS: ICD-10-CM

## 2022-12-01 DIAGNOSIS — R73.03 PREDIABETES: ICD-10-CM

## 2022-12-01 LAB — HBA1C MFR BLD: 5.7 %

## 2022-12-01 PROCEDURE — 83036 HEMOGLOBIN GLYCOSYLATED A1C: CPT | Performed by: NURSE PRACTITIONER

## 2022-12-01 PROCEDURE — G8417 CALC BMI ABV UP PARAM F/U: HCPCS | Performed by: NURSE PRACTITIONER

## 2022-12-01 PROCEDURE — G8484 FLU IMMUNIZE NO ADMIN: HCPCS | Performed by: NURSE PRACTITIONER

## 2022-12-01 PROCEDURE — 1036F TOBACCO NON-USER: CPT | Performed by: NURSE PRACTITIONER

## 2022-12-01 PROCEDURE — 99213 OFFICE O/P EST LOW 20 MIN: CPT | Performed by: NURSE PRACTITIONER

## 2022-12-01 PROCEDURE — G8427 DOCREV CUR MEDS BY ELIG CLIN: HCPCS | Performed by: NURSE PRACTITIONER

## 2022-12-01 ASSESSMENT — COLUMBIA-SUICIDE SEVERITY RATING SCALE - C-SSRS
1. WITHIN THE PAST MONTH, HAVE YOU WISHED YOU WERE DEAD OR WISHED YOU COULD GO TO SLEEP AND NOT WAKE UP?: YES
6. HAVE YOU EVER DONE ANYTHING, STARTED TO DO ANYTHING, OR PREPARED TO DO ANYTHING TO END YOUR LIFE?: NO
2. HAVE YOU ACTUALLY HAD ANY THOUGHTS OF KILLING YOURSELF?: YES
3. HAVE YOU BEEN THINKING ABOUT HOW YOU MIGHT KILL YOURSELF?: NO
4. HAVE YOU HAD THESE THOUGHTS AND HAD SOME INTENTION OF ACTING ON THEM?: NO
5. HAVE YOU STARTED TO WORK OUT OR WORKED OUT THE DETAILS OF HOW TO KILL YOURSELF? DO YOU INTEND TO CARRY OUT THIS PLAN?: NO

## 2022-12-01 ASSESSMENT — ENCOUNTER SYMPTOMS
SHORTNESS OF BREATH: 0
ABDOMINAL PAIN: 0
CHEST TIGHTNESS: 0
COUGH: 0
DIARRHEA: 0
NAUSEA: 0
CONSTIPATION: 0
VOMITING: 0
WHEEZING: 0

## 2022-12-01 ASSESSMENT — PATIENT HEALTH QUESTIONNAIRE - PHQ9
4. FEELING TIRED OR HAVING LITTLE ENERGY: 3
7. TROUBLE CONCENTRATING ON THINGS, SUCH AS READING THE NEWSPAPER OR WATCHING TELEVISION: 3
SUM OF ALL RESPONSES TO PHQ QUESTIONS 1-9: 21
SUM OF ALL RESPONSES TO PHQ QUESTIONS 1-9: 24
2. FEELING DOWN, DEPRESSED OR HOPELESS: 3
8. MOVING OR SPEAKING SO SLOWLY THAT OTHER PEOPLE COULD HAVE NOTICED. OR THE OPPOSITE, BEING SO FIGETY OR RESTLESS THAT YOU HAVE BEEN MOVING AROUND A LOT MORE THAN USUAL: 3
10. IF YOU CHECKED OFF ANY PROBLEMS, HOW DIFFICULT HAVE THESE PROBLEMS MADE IT FOR YOU TO DO YOUR WORK, TAKE CARE OF THINGS AT HOME, OR GET ALONG WITH OTHER PEOPLE: 2
6. FEELING BAD ABOUT YOURSELF - OR THAT YOU ARE A FAILURE OR HAVE LET YOURSELF OR YOUR FAMILY DOWN: 3
9. THOUGHTS THAT YOU WOULD BE BETTER OFF DEAD, OR OF HURTING YOURSELF: 3
SUM OF ALL RESPONSES TO PHQ QUESTIONS 1-9: 24
SUM OF ALL RESPONSES TO PHQ9 QUESTIONS 1 & 2: 3
1. LITTLE INTEREST OR PLEASURE IN DOING THINGS: 0
3. TROUBLE FALLING OR STAYING ASLEEP: 3
5. POOR APPETITE OR OVEREATING: 3
SUM OF ALL RESPONSES TO PHQ QUESTIONS 1-9: 24
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
## 2022-12-01 NOTE — ASSESSMENT & PLAN NOTE
A1c 5.7. Denies polyuria, polydipsia, polyphagia. Lack of appetite with worsening depression and has lost weight since last appointment. She has also cut out pop.
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
## 2023-01-07 ENCOUNTER — HOSPITAL ENCOUNTER (EMERGENCY)
Age: 38
Discharge: PSYCHIATRIC OR SUBSTANCE USE FACILITY | End: 2023-01-08
Attending: EMERGENCY MEDICINE
Payer: COMMERCIAL

## 2023-01-07 DIAGNOSIS — F43.20 ADJUSTMENT DISORDER, UNSPECIFIED TYPE: ICD-10-CM

## 2023-01-07 DIAGNOSIS — N39.0 URINARY TRACT INFECTION WITHOUT HEMATURIA, SITE UNSPECIFIED: ICD-10-CM

## 2023-01-07 DIAGNOSIS — F39 MOOD DISORDER (HCC): Primary | ICD-10-CM

## 2023-01-07 LAB
ACETAMINOPHEN LEVEL: <5 UG/ML (ref 15–30)
ALBUMIN SERPL-MCNC: 4.3 GM/DL (ref 3.4–5)
ALCOHOL SCREEN SERUM: <0.01 %WT/VOL
ALP BLD-CCNC: 117 IU/L (ref 40–129)
ALT SERPL-CCNC: 31 U/L (ref 10–40)
AMPHETAMINES: NEGATIVE
ANION GAP SERPL CALCULATED.3IONS-SCNC: 13 MMOL/L (ref 4–16)
AST SERPL-CCNC: 22 IU/L (ref 15–37)
BACTERIA: ABNORMAL /HPF
BARBITURATE SCREEN URINE: NEGATIVE
BASOPHILS ABSOLUTE: 0.1 K/CU MM
BASOPHILS RELATIVE PERCENT: 0.4 % (ref 0–1)
BENZODIAZEPINE SCREEN, URINE: NEGATIVE
BILIRUB SERPL-MCNC: 0.3 MG/DL (ref 0–1)
BILIRUBIN URINE: NEGATIVE MG/DL
BLOOD, URINE: NEGATIVE
BUN BLDV-MCNC: 13 MG/DL (ref 6–23)
CALCIUM SERPL-MCNC: 9.4 MG/DL (ref 8.3–10.6)
CANNABINOID SCREEN URINE: NEGATIVE
CAST TYPE: ABNORMAL /HPF
CHLORIDE BLD-SCNC: 104 MMOL/L (ref 99–110)
CLARITY: ABNORMAL
CO2: 24 MMOL/L (ref 21–32)
COCAINE METABOLITE: NEGATIVE
COLOR: YELLOW
CREAT SERPL-MCNC: 0.8 MG/DL (ref 0.6–1.1)
CRYSTAL TYPE: NEGATIVE /HPF
DIFFERENTIAL TYPE: ABNORMAL
DOSE AMOUNT: ABNORMAL
DOSE AMOUNT: ABNORMAL
DOSE TIME: ABNORMAL
DOSE TIME: ABNORMAL
EOSINOPHILS ABSOLUTE: 0.3 K/CU MM
EOSINOPHILS RELATIVE PERCENT: 1.8 % (ref 0–3)
EPITHELIAL CELLS, UA: 5 /HPF
GFR SERPL CREATININE-BSD FRML MDRD: >60 ML/MIN/1.73M2
GLUCOSE BLD-MCNC: 107 MG/DL (ref 70–99)
GLUCOSE, URINE: NEGATIVE MG/DL
HCT VFR BLD CALC: 42 % (ref 37–47)
HEMOGLOBIN: 14.2 GM/DL (ref 12.5–16)
IMMATURE NEUTROPHIL %: 0.5 % (ref 0–0.43)
INTERPRETATION: NORMAL
KETONES, URINE: NEGATIVE MG/DL
LEUKOCYTE ESTERASE, URINE: ABNORMAL
LYMPHOCYTES ABSOLUTE: 2.6 K/CU MM
LYMPHOCYTES RELATIVE PERCENT: 17.5 % (ref 24–44)
MCH RBC QN AUTO: 28.5 PG (ref 27–31)
MCHC RBC AUTO-ENTMCNC: 33.8 % (ref 32–36)
MCV RBC AUTO: 84.3 FL (ref 78–100)
MONOCYTES ABSOLUTE: 1.2 K/CU MM
MONOCYTES RELATIVE PERCENT: 7.8 % (ref 0–4)
NITRITE URINE, QUANTITATIVE: POSITIVE
OPIATES, URINE: NEGATIVE
OXYCODONE: NEGATIVE
PDW BLD-RTO: 12.9 % (ref 11.7–14.9)
PH, URINE: 6 (ref 5–8)
PHENCYCLIDINE, URINE: NEGATIVE
PLATELET # BLD: 443 K/CU MM (ref 140–440)
PMV BLD AUTO: 9.8 FL (ref 7.5–11.1)
POTASSIUM SERPL-SCNC: 3.8 MMOL/L (ref 3.5–5.1)
PREGNANCY, URINE: NEGATIVE
PROTEIN UA: NEGATIVE MG/DL
RAPID INFLUENZA  B AGN: NEGATIVE
RAPID INFLUENZA A AGN: NEGATIVE
RBC # BLD: 4.98 M/CU MM (ref 4.2–5.4)
RBC URINE: 0 /HPF (ref 0–6)
SALICYLATE LEVEL: <0.3 MG/DL (ref 15–30)
SARS-COV-2, NAAT: NOT DETECTED
SEGMENTED NEUTROPHILS ABSOLUTE COUNT: 10.6 K/CU MM
SEGMENTED NEUTROPHILS RELATIVE PERCENT: 72 % (ref 36–66)
SODIUM BLD-SCNC: 141 MMOL/L (ref 135–145)
SOURCE: NORMAL
SPECIFIC GRAVITY UA: 1.02 (ref 1–1.03)
SPECIFIC GRAVITY, URINE: 1.02 (ref 1–1.03)
TOTAL IMMATURE NEUTOROPHIL: 0.08 K/CU MM
TOTAL PROTEIN: 7.3 GM/DL (ref 6.4–8.2)
UROBILINOGEN, URINE: 0.2 MG/DL (ref 0.2–1)
WBC # BLD: 14.7 K/CU MM (ref 4–10.5)
WBC UA: 10 /HPF (ref 0–5)

## 2023-01-07 PROCEDURE — 81025 URINE PREGNANCY TEST: CPT

## 2023-01-07 PROCEDURE — 87088 URINE BACTERIA CULTURE: CPT

## 2023-01-07 PROCEDURE — 87804 INFLUENZA ASSAY W/OPTIC: CPT

## 2023-01-07 PROCEDURE — 81001 URINALYSIS AUTO W/SCOPE: CPT

## 2023-01-07 PROCEDURE — 80053 COMPREHEN METABOLIC PANEL: CPT

## 2023-01-07 PROCEDURE — 87086 URINE CULTURE/COLONY COUNT: CPT

## 2023-01-07 PROCEDURE — 99285 EMERGENCY DEPT VISIT HI MDM: CPT

## 2023-01-07 PROCEDURE — 80307 DRUG TEST PRSMV CHEM ANLYZR: CPT

## 2023-01-07 PROCEDURE — 6370000000 HC RX 637 (ALT 250 FOR IP): Performed by: EMERGENCY MEDICINE

## 2023-01-07 PROCEDURE — G0480 DRUG TEST DEF 1-7 CLASSES: HCPCS

## 2023-01-07 PROCEDURE — 85025 COMPLETE CBC W/AUTO DIFF WBC: CPT

## 2023-01-07 PROCEDURE — 87635 SARS-COV-2 COVID-19 AMP PRB: CPT

## 2023-01-07 PROCEDURE — 87186 SC STD MICRODIL/AGAR DIL: CPT

## 2023-01-07 RX ORDER — NITROFURANTOIN 25; 75 MG/1; MG/1
100 CAPSULE ORAL ONCE
Status: COMPLETED | OUTPATIENT
Start: 2023-01-07 | End: 2023-01-07

## 2023-01-07 RX ORDER — HYDROXYZINE PAMOATE 25 MG/1
CAPSULE ORAL
COMMUNITY
Start: 2022-12-17

## 2023-01-07 RX ADMIN — NITROFURANTOIN MONOHYDRATE/MACROCRYSTALLINE 100 MG: 25; 75 CAPSULE ORAL at 21:12

## 2023-01-08 VITALS
HEIGHT: 64 IN | TEMPERATURE: 98.2 F | RESPIRATION RATE: 18 BRPM | WEIGHT: 200 LBS | BODY MASS INDEX: 34.15 KG/M2 | HEART RATE: 84 BPM | DIASTOLIC BLOOD PRESSURE: 71 MMHG | OXYGEN SATURATION: 100 % | SYSTOLIC BLOOD PRESSURE: 124 MMHG

## 2023-01-08 LAB
AVERAGE GLUCOSE: NORMAL
HBA1C MFR BLD: 6.2 %
TSH SERPL DL<=0.05 MIU/L-ACNC: 0.2 UIU/ML

## 2023-01-08 NOTE — ED NOTES
Pt resting with eyes closed. No needs voiced. Respirations e/u. Will continue to monitor.       Sienna Brown RN  01/07/23 [id number]

## 2023-01-08 NOTE — ED NOTES
Called the access Center spoke with Mily Lee, let her know already Fax packet to [facility] and [facility]  01/07/23 [id number]

## 2023-01-08 NOTE — ED NOTES
[id number] gave update of Acceptance to [facility] and that Adelina will be here @ 11:45pm     Raul Salas  01/07/23 [id number]

## 2023-01-08 NOTE — ED NOTES
Received a call from [facility] accepted the pt, asked that we fax pink slip and face sheet     Mitch Ortiznay  01/07/23 [id number]

## 2023-01-08 NOTE — ED NOTES
Pt resting in bed. Cooperative with care. [place] and water provided.       Liam Mas RN  01/07/23 [id number]
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
## 2023-01-08 NOTE — ED NOTES
Pt awaiting placement at this time. Continues to rest in bed with no behaviors.       Heber Vasquez RN  01/07/23 8052

## 2023-01-08 NOTE — ED NOTES
Pt resting with eyes closed. pt given gown for transport. Pt's belongings x 3 bags given to superior.       Cassy Aguilar, RASHMI  01/08/23 [id number]

## 2023-01-08 NOTE — ED NOTES
Assumed safety sitter for pt. Report received from Sally Maldonado on pt's plan of care. Pt resting with eyes closed. Respirations e/u. Will continue to monitor.      Zayda Mcgregor RN  01/07/23 3564

## 2023-01-08 NOTE — ED NOTES
[facility] called for updated eta dispatcher states \" they should be there in a few minutes.  \"     Ryder Manzanares RN  01/08/23 0028

## 2023-01-08 NOTE — ED NOTES
Pt resting at this time with eyes closed. No s/sx of distress.       Criselda Bear, RN  01/08/23 0002

## 2023-01-09 LAB
CULTURE: ABNORMAL
CULTURE: ABNORMAL
Lab: ABNORMAL
SPECIMEN: ABNORMAL

## 2023-01-11 ENCOUNTER — TELEPHONE (OUTPATIENT)
Dept: PHARMACY | Age: 38
End: 2023-01-11

## 2023-01-11 NOTE — TELEPHONE ENCOUNTER
Pharmacy Note  ED Culture Follow-up    Chick Russ is a 30 y.o. female. Allergies: Pcn [penicillins]     Labs:  Lab Results   Component Value Date    BUN 13 01/07/2023    CREATININE 0.8 01/07/2023    WBC 14.7 (H) 01/07/2023     Estimated Creatinine Clearance: 107 mL/min (based on SCr of 0.8 mg/dL). Current antimicrobials:   Nitrofurantoin    ASSESSMENT:  Micro results:   Urine culture: positive for E. Coli >100,000 CFU/ml     PLAN:  Need for intervention: Yes  Discussed with: Dr. Julius Gilbert treatment:    Informed patient of urine culture result. Patient states that Kaitlin Rasheed discharged her on a course of nitrofurantoin for her UTI. No further action needed at this time. Patient response:   Called and spoke with patient. Counseled patient on following up with PCP    Called/sent in prescription to: Not applicable    Please call with any questions.  MARCELLE Lomeli ANTOINE [facility], PharmD 4:11 PM 1/11/2023

## 2023-01-11 NOTE — PROGRESS NOTES
Pharmacy Note  ED Culture Follow-up    Candice Lim is a 30 y.o. female. Allergies: Pcn [penicillins]     Labs:  Lab Results   Component Value Date    BUN 13 01/07/2023    CREATININE 0.8 01/07/2023    WBC 14.7 (H) 01/07/2023     Estimated Creatinine Clearance: 107 mL/min (based on SCr of 0.8 mg/dL). Current antimicrobials:   Nitrofurantoin    ASSESSMENT:  Micro results:   Urine culture: positive for E. Coli >100,000 CFU/ml     PLAN:  Need for intervention: Yes  Discussed with: Dr. Li Goodwin treatment:    Informed patient of urine culture result. Patient states that Karri Roth discharged her on a course of nitrofurantoin for her UTI. No further action needed at this time. Patient response:   Called and spoke with patient. Counseled patient on following up with PCP    Called/sent in prescription to: Not applicable    Please call with any questions.  Carmela Keating, [facility], PharmD 4:11 PM 1/11/2023

## 2023-01-16 ENCOUNTER — HOSPITAL ENCOUNTER (EMERGENCY)
Age: 38
Discharge: HOME | End: 2023-01-16
Attending: EMERGENCY MEDICINE
Payer: COMMERCIAL

## 2023-01-16 VITALS
OXYGEN SATURATION: 93 % | WEIGHT: 200 LBS | DIASTOLIC BLOOD PRESSURE: 77 MMHG | BODY MASS INDEX: 34.15 KG/M2 | HEIGHT: 64 IN | RESPIRATION RATE: 20 BRPM | SYSTOLIC BLOOD PRESSURE: 118 MMHG | HEART RATE: 115 BPM | TEMPERATURE: 98.9 F

## 2023-01-16 DIAGNOSIS — B34.9 ACUTE VIRAL SYNDROME: Primary | ICD-10-CM

## 2023-01-16 PROCEDURE — 99283 EMERGENCY DEPT VISIT LOW MDM: CPT

## 2023-01-16 PROCEDURE — 6370000000 HC RX 637 (ALT 250 FOR IP): Performed by: EMERGENCY MEDICINE

## 2023-01-16 RX ORDER — ONDANSETRON 4 MG/1
4 TABLET, FILM COATED ORAL 3 TIMES DAILY PRN
Qty: 10 TABLET | Refills: 0 | Status: SHIPPED | OUTPATIENT
Start: 2023-01-16

## 2023-01-16 RX ORDER — BUSPIRONE HYDROCHLORIDE 10 MG/1
10 TABLET ORAL 3 TIMES DAILY
COMMUNITY

## 2023-01-16 RX ORDER — ONDANSETRON 4 MG/1
4 TABLET, FILM COATED ORAL ONCE
Status: COMPLETED | OUTPATIENT
Start: 2023-01-16 | End: 2023-01-16

## 2023-01-16 RX ORDER — ACETAMINOPHEN 500 MG
1000 TABLET ORAL ONCE
Status: COMPLETED | OUTPATIENT
Start: 2023-01-16 | End: 2023-01-16

## 2023-01-16 RX ADMIN — ONDANSETRON HYDROCHLORIDE 4 MG: 4 TABLET, FILM COATED ORAL at 13:26

## 2023-01-16 RX ADMIN — ACETAMINOPHEN 1000 MG: 500 TABLET ORAL at 13:25

## 2023-01-16 ASSESSMENT — PAIN SCALES - GENERAL: PAINLEVEL_OUTOF10: 8

## 2023-01-16 ASSESSMENT — PAIN - FUNCTIONAL ASSESSMENT: PAIN_FUNCTIONAL_ASSESSMENT: 0-10

## 2023-01-16 ASSESSMENT — PAIN DESCRIPTION - ORIENTATION: ORIENTATION: MID;UPPER

## 2023-01-16 ASSESSMENT — ENCOUNTER SYMPTOMS
COUGH: 1
ABDOMINAL DISTENTION: 0
NAUSEA: 1
ABDOMINAL PAIN: 0

## 2023-01-16 ASSESSMENT — PAIN DESCRIPTION - PAIN TYPE: TYPE: ACUTE PAIN

## 2023-01-16 ASSESSMENT — PAIN DESCRIPTION - FREQUENCY: FREQUENCY: CONTINUOUS

## 2023-01-16 ASSESSMENT — PAIN DESCRIPTION - LOCATION: LOCATION: ABDOMEN

## 2023-01-16 ASSESSMENT — PAIN DESCRIPTION - DESCRIPTORS: DESCRIPTORS: ACHING;DISCOMFORT
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
## 2023-01-30 LAB — PAP SMEAR, EXTERNAL: NEGATIVE

## 2023-02-02 ENCOUNTER — OFFICE VISIT (OUTPATIENT)
Dept: FAMILY MEDICINE CLINIC | Age: 38
End: 2023-02-02
Payer: COMMERCIAL

## 2023-02-02 VITALS
OXYGEN SATURATION: 97 % | HEART RATE: 100 BPM | SYSTOLIC BLOOD PRESSURE: 124 MMHG | TEMPERATURE: 97.7 F | WEIGHT: 204.2 LBS | BODY MASS INDEX: 35.05 KG/M2 | DIASTOLIC BLOOD PRESSURE: 78 MMHG | RESPIRATION RATE: 16 BRPM

## 2023-02-02 DIAGNOSIS — F33.2 SEVERE EPISODE OF RECURRENT MAJOR DEPRESSIVE DISORDER, WITHOUT PSYCHOTIC FEATURES (HCC): ICD-10-CM

## 2023-02-02 DIAGNOSIS — B34.9 VIRAL ILLNESS: ICD-10-CM

## 2023-02-02 DIAGNOSIS — F41.9 ANXIETY: ICD-10-CM

## 2023-02-02 DIAGNOSIS — R79.89 ABNORMAL TSH: ICD-10-CM

## 2023-02-02 DIAGNOSIS — L65.9 HAIR LOSS: ICD-10-CM

## 2023-02-02 DIAGNOSIS — J02.9 SORE THROAT: Primary | ICD-10-CM

## 2023-02-02 LAB — S PYO AG THROAT QL: NORMAL

## 2023-02-02 PROCEDURE — 1036F TOBACCO NON-USER: CPT | Performed by: NURSE PRACTITIONER

## 2023-02-02 PROCEDURE — G8427 DOCREV CUR MEDS BY ELIG CLIN: HCPCS | Performed by: NURSE PRACTITIONER

## 2023-02-02 PROCEDURE — 87880 STREP A ASSAY W/OPTIC: CPT | Performed by: NURSE PRACTITIONER

## 2023-02-02 PROCEDURE — 99213 OFFICE O/P EST LOW 20 MIN: CPT | Performed by: NURSE PRACTITIONER

## 2023-02-02 PROCEDURE — G8484 FLU IMMUNIZE NO ADMIN: HCPCS | Performed by: NURSE PRACTITIONER

## 2023-02-02 PROCEDURE — G8417 CALC BMI ABV UP PARAM F/U: HCPCS | Performed by: NURSE PRACTITIONER

## 2023-02-02 RX ORDER — AMITRIPTYLINE HYDROCHLORIDE 25 MG/1
TABLET, FILM COATED ORAL
COMMUNITY
Start: 2023-01-11

## 2023-02-02 RX ORDER — SERTRALINE HYDROCHLORIDE 100 MG/1
TABLET, FILM COATED ORAL
COMMUNITY
Start: 2023-01-23

## 2023-02-02 RX ORDER — METRONIDAZOLE 500 MG/1
500 TABLET ORAL 2 TIMES DAILY
COMMUNITY
End: 2023-02-02

## 2023-02-02 RX ORDER — HYDROXYZINE PAMOATE 25 MG/1
CAPSULE ORAL
Status: CANCELLED | OUTPATIENT
Start: 2023-02-02

## 2023-02-02 ASSESSMENT — ENCOUNTER SYMPTOMS
NAUSEA: 0
DIARRHEA: 0
VOMITING: 0
WHEEZING: 0
CHEST TIGHTNESS: 0
COUGH: 0
VOICE CHANGE: 1
SHORTNESS OF BREATH: 0
SORE THROAT: 1
ABDOMINAL PAIN: 0
CONSTIPATION: 0

## 2023-02-02 ASSESSMENT — COLUMBIA-SUICIDE SEVERITY RATING SCALE - C-SSRS
6. HAVE YOU EVER DONE ANYTHING, STARTED TO DO ANYTHING, OR PREPARED TO DO ANYTHING TO END YOUR LIFE?: YES
1. WITHIN THE PAST MONTH, HAVE YOU WISHED YOU WERE DEAD OR WISHED YOU COULD GO TO SLEEP AND NOT WAKE UP?: YES
5. HAVE YOU STARTED TO WORK OUT OR WORKED OUT THE DETAILS OF HOW TO KILL YOURSELF? DO YOU INTEND TO CARRY OUT THIS PLAN?: YES
2. HAVE YOU ACTUALLY HAD ANY THOUGHTS OF KILLING YOURSELF?: YES
3. HAVE YOU BEEN THINKING ABOUT HOW YOU MIGHT KILL YOURSELF?: NO
4. HAVE YOU HAD THESE THOUGHTS AND HAD SOME INTENTION OF ACTING ON THEM?: YES
7. DID THIS OCCUR IN THE LAST THREE MONTHS: YES

## 2023-02-02 ASSESSMENT — ANXIETY QUESTIONNAIRES
GAD7 TOTAL SCORE: 14
6. BECOMING EASILY ANNOYED OR IRRITABLE: 0
4. TROUBLE RELAXING: 2
1. FEELING NERVOUS, ANXIOUS, OR ON EDGE: 3
3. WORRYING TOO MUCH ABOUT DIFFERENT THINGS: 3
5. BEING SO RESTLESS THAT IT IS HARD TO SIT STILL: 2
7. FEELING AFRAID AS IF SOMETHING AWFUL MIGHT HAPPEN: 3
2. NOT BEING ABLE TO STOP OR CONTROL WORRYING: 1

## 2023-02-02 ASSESSMENT — PATIENT HEALTH QUESTIONNAIRE - PHQ9
SUM OF ALL RESPONSES TO PHQ QUESTIONS 1-9: 21
1. LITTLE INTEREST OR PLEASURE IN DOING THINGS: 3
5. POOR APPETITE OR OVEREATING: 3
3. TROUBLE FALLING OR STAYING ASLEEP: 3
2. FEELING DOWN, DEPRESSED OR HOPELESS: 3
SUM OF ALL RESPONSES TO PHQ9 QUESTIONS 1 & 2: 6
6. FEELING BAD ABOUT YOURSELF - OR THAT YOU ARE A FAILURE OR HAVE LET YOURSELF OR YOUR FAMILY DOWN: 3
10. IF YOU CHECKED OFF ANY PROBLEMS, HOW DIFFICULT HAVE THESE PROBLEMS MADE IT FOR YOU TO DO YOUR WORK, TAKE CARE OF THINGS AT HOME, OR GET ALONG WITH OTHER PEOPLE: 3
SUM OF ALL RESPONSES TO PHQ QUESTIONS 1-9: 21
9. THOUGHTS THAT YOU WOULD BE BETTER OFF DEAD, OR OF HURTING YOURSELF: 0
SUM OF ALL RESPONSES TO PHQ QUESTIONS 1-9: 21
4. FEELING TIRED OR HAVING LITTLE ENERGY: 3
7. TROUBLE CONCENTRATING ON THINGS, SUCH AS READING THE NEWSPAPER OR WATCHING TELEVISION: 3
SUM OF ALL RESPONSES TO PHQ QUESTIONS 1-9: 21
8. MOVING OR SPEAKING SO SLOWLY THAT OTHER PEOPLE COULD HAVE NOTICED. OR THE OPPOSITE, BEING SO FIGETY OR RESTLESS THAT YOU HAVE BEEN MOVING AROUND A LOT MORE THAN USUAL: 0

## 2023-02-02 NOTE — ASSESSMENT & PLAN NOTE
She is now seen by behavioral health. Her Zoloft was increased. She was then inpatient behavioral health for 4 days. Elavil was added and it has helped her sleep. She does still wake at night but she is able to return to sleep. She feels that her mood has been better. Denies SI. She also sees a counselor and had an appointment today. Returns later this month. She is currently staying in a shelter. States that she should be would return home next week.   PHQ Scores 2/2/2023 12/1/2022 10/19/2022 9/15/2022   PHQ2 Score 6 3 6 0   PHQ9 Score 21 24 21 0     Interpretation of Total Score Depression Severity: 1-4 = Minimal depression, 5-9 = Mild depression, 10-14 = Moderate depression, 15-19 = Moderately severe depression, 20-27 = Severe depression

## 2023-02-02 NOTE — PROGRESS NOTES
SUBJECTIVE:    Rodrigo Pimentel  1985  30 y.o.  female      Chief Complaint   Patient presents with    Follow-up     Has had sore throat for 3 days. Flu Vaccine     Declined flu vaccine      HPI    Allergies   Allergen Reactions    Pcn [Penicillins] Anaphylaxis       Current Outpatient Medications   Medication Sig Dispense Refill    busPIRone (BUSPAR) 10 MG tablet Take 10 mg by mouth 3 times daily      hydrOXYzine pamoate (VISTARIL) 25 MG capsule PLEASE SEE ATTACHED FOR DETAILED DIRECTIONS      progesterone (PROMETRIUM) 200 MG CAPS capsule Take 200 mg by mouth nightly Takes the 1st of month for 14 days. amitriptyline (ELAVIL) 25 MG tablet       sertraline (ZOLOFT) 100 MG tablet TAKE 1 TABLET BY MOUTH EVERY DAY       No current facility-administered medications for this visit. Past Medical History:   Diagnosis Date    Anxiety      Past Surgical History:   Procedure Laterality Date    BUNIONECTOMY Right     6 years ago     Social History     Socioeconomic History    Marital status:      Spouse name: None    Number of children: None    Years of education: None    Highest education level: None   Tobacco Use    Smoking status: Never    Smokeless tobacco: Never   Vaping Use    Vaping Use: Every day    Substances: Nicotine    Devices: Refillable tank    Passive vaping exposure: Yes   Substance and Sexual Activity    Alcohol use: Not Currently    Drug use: Never    Sexual activity: Yes     Partners: Male     Symptom onset three days ago. Complains of sore throat, congestion, hoarse voice, postnasal drainage. Denies fever, cough, wheezing. She has been using cough drops and chloraseptic spray with relief. Symptoms gradually improving. States that while she was in the behavioral health hospital she was told that her thyroid was abnormal.  She does have heat intolerance and thinning hair. Advised that we will recheck this.     Severe episode of recurrent major depressive disorder, without psychotic features [facility])  She is now seen by behavioral health. Her Zoloft was increased. She was then inpatient behavioral health for 4 days. Elavil was added and it has helped her sleep. She does still wake at night but she is able to return to sleep. She feels that her mood has been better. Denies SI. She also sees a counselor and had an appointment today. Returns later this month. She is currently staying in a shelter. States that she should be would return home next week. PHQ Scores 2/2/2023 12/1/2022 10/19/2022 9/15/2022   PHQ2 Score 6 3 6 0   PHQ9 Score 21 24 21 0     Interpretation of Total Score Depression Severity: 1-4 = Minimal depression, 5-9 = Mild depression, 10-14 = Moderate depression, 15-19 = Moderately severe depression, 20-27 = Severe depression       Anxiety  Sleep has been better with the addition of Elavil. Believes she will be able to go home next week. States \"every day there is something new with Alexander's ex\". She does see behavioral health and a counselor. ARLINE-7 SCREENING 2/2/2023 10/19/2022 9/15/2022   Feeling nervous, anxious, or on edge Nearly every day Nearly every day Not at all   Not being able to stop or control worrying Several days Several days Not at all   Worrying too much about different things Nearly every day Nearly every day Several days   Trouble relaxing More than half the days Several days Not at all   Being so restless that it is hard to sit still More than half the days More than half the days More than half the days   Becoming easily annoyed or irritable Not at all More than half the days Not at all   Feeling afraid as if something awful might happen Nearly every day Several days Not at all   ARLINE-7 Total Score 14 13 3             Review of Systems   Constitutional:  Negative for appetite change, chills, fatigue, fever and unexpected weight change. HENT:  Positive for congestion, postnasal drip, sore throat and voice change.     Respiratory:  Negative for cough, chest tightness, shortness of breath and wheezing. Cardiovascular:  Negative for chest pain, palpitations and leg swelling. Gastrointestinal:  Negative for abdominal pain, constipation, diarrhea, nausea and vomiting. Musculoskeletal:  Negative for arthralgias. Neurological:  Negative for weakness, numbness and headaches. Hematological:  Negative for adenopathy. Psychiatric/Behavioral:  Positive for dysphoric mood and sleep disturbance. Negative for suicidal ideas. The patient is nervous/anxious. OBJECTIVE:    /78 (Site: Left Upper Arm, Position: Sitting, Cuff Size: Large Adult)   Pulse 100   Temp 97.7 °F (36.5 °C) (Infrared)   Resp 16   Wt 204 lb 3.2 oz (92.6 kg)   LMP 01/17/2023   SpO2 97%   BMI 35.05 kg/m²     Physical Exam  Constitutional:       Appearance: Normal appearance. She is well-developed. HENT:      Head: Normocephalic and atraumatic. Right Ear: Hearing normal. There is impacted cerumen. Left Ear: Hearing, tympanic membrane, ear canal and external ear normal.      Nose: Nose normal.      Right Sinus: No maxillary sinus tenderness or frontal sinus tenderness. Left Sinus: No maxillary sinus tenderness or frontal sinus tenderness. Mouth/Throat:      Mouth: Mucous membranes are moist.      Pharynx: Uvula midline. Posterior oropharyngeal erythema present. No pharyngeal swelling or oropharyngeal exudate. Tonsils: No tonsillar exudate. Cardiovascular:      Rate and Rhythm: Normal rate and regular rhythm. Heart sounds: Normal heart sounds. No murmur heard. No friction rub. No gallop. Pulmonary:      Effort: Pulmonary effort is normal. No respiratory distress. Breath sounds: Normal breath sounds. No wheezing, rhonchi or rales. Abdominal:      Palpations: Abdomen is soft. Musculoskeletal:         General: Normal range of motion. Cervical back: Normal range of motion and neck supple.    Lymphadenopathy:      Cervical: No cervical adenopathy. Right cervical: No superficial or posterior cervical adenopathy. Left cervical: No superficial or posterior cervical adenopathy. Skin:     General: Skin is warm and dry. Neurological:      General: No focal deficit present. Mental Status: She is alert and oriented to person, place, and time. Psychiatric:         Mood and Affect: Mood is anxious and depressed. Behavior: Behavior normal. Behavior is cooperative. Thought Content: Thought content normal. Thought content does not include homicidal or suicidal ideation. Thought content does not include homicidal or suicidal plan. ASSESSMENT/PLAN:    1. Sore throat  Negative strep. Likely viral. Recommend tylenol or ibuprofen for pain. Warm salt water gargles, tea with honey. Follow up if not improving  - POCT rapid strep A    2. Abnormal TSH  recheck  - TSH with Reflex; Future    3. Severe episode of recurrent major depressive disorder, without psychotic features ([facility] [address]  Continue zoloft and follow up with behavioral health. Able to contract for safety    4. Anxiety  Continue buspar, counseling    5. Hair loss  Recheck thyroid    6. Viral illness  See above. Return in about 3 months (around 5/2/2023), or if symptoms worsen or fail to improve.       (Please note that portions of this note may have been completed with a voice recognition program. Efforts were made to edit the dictations but occasionally words aremis-transcribed.)

## 2023-02-02 NOTE — ASSESSMENT & PLAN NOTE
Sleep has been better with the addition of Elavil. Believes she will be able to go home next week. States \"every day there is something new with Alexander's ex\". She does see behavioral health and a counselor.   ARLINE-7 SCREENING 2/2/2023 10/19/2022 9/15/2022   Feeling nervous, anxious, or on edge Nearly every day Nearly every day Not at all   Not being able to stop or control worrying Several days Several days Not at all   Worrying too much about different things Nearly every day Nearly every day Several days   Trouble relaxing More than half the days Several days Not at all   Being so restless that it is hard to sit still More than half the days More than half the days More than half the days   Becoming easily annoyed or irritable Not at all More than half the days Not at all   Feeling afraid as if something awful might happen Nearly every day Several days Not at all   ARLINE-7 Total Score 14 13 3

## 2023-02-03 DIAGNOSIS — R79.89 ABNORMAL TSH: ICD-10-CM

## 2023-02-03 LAB — TSH REFLEX: 0.41 UIU/ML (ref 0.27–4.2)

## 2023-03-21 ENCOUNTER — HOSPITAL ENCOUNTER (EMERGENCY)
Age: 38
Discharge: HOME | End: 2023-03-21
Attending: EMERGENCY MEDICINE
Payer: COMMERCIAL

## 2023-03-21 VITALS
OXYGEN SATURATION: 100 % | RESPIRATION RATE: 18 BRPM | BODY MASS INDEX: 35.85 KG/M2 | HEART RATE: 96 BPM | DIASTOLIC BLOOD PRESSURE: 66 MMHG | WEIGHT: 210 LBS | SYSTOLIC BLOOD PRESSURE: 131 MMHG | TEMPERATURE: 97.9 F | HEIGHT: 64 IN

## 2023-03-21 DIAGNOSIS — J06.9 VIRAL URI WITH COUGH: Primary | ICD-10-CM

## 2023-03-21 PROCEDURE — 99282 EMERGENCY DEPT VISIT SF MDM: CPT

## 2023-03-21 ASSESSMENT — ENCOUNTER SYMPTOMS
COUGH: 1
SHORTNESS OF BREATH: 0

## 2023-03-21 ASSESSMENT — PAIN SCALES - GENERAL: PAINLEVEL_OUTOF10: 7

## 2023-03-21 ASSESSMENT — PAIN DESCRIPTION - PAIN TYPE: TYPE: ACUTE PAIN

## 2023-03-21 ASSESSMENT — PAIN DESCRIPTION - LOCATION: LOCATION: CHEST;THROAT

## 2023-03-21 ASSESSMENT — PAIN DESCRIPTION - DESCRIPTORS: DESCRIPTORS: BURNING

## 2023-03-21 ASSESSMENT — PAIN - FUNCTIONAL ASSESSMENT: PAIN_FUNCTIONAL_ASSESSMENT: 0-10

## 2023-03-21 NOTE — ED NOTES
Discharge instructions reviewed with patient. No additional questions asked. Voiced understanding. Encouraged patient to follow up as discussed by the ED physician.      Anuradha Clarke RN  03/21/23 [id number]

## 2023-03-21 NOTE — ED PROVIDER NOTES
symmetric. Reflexes normal.   Psychiatric:         Speech: Speech normal.         Behavior: Behavior normal.         Thought Content: Thought content normal.         Judgment: Judgment normal.       I have reviewed and interpreted all of the currently available lab results from this visit (ifapplicable):  No results found for this visit on 03/21/23. Radiographs (if obtained):  [] The following radiograph wasinterpreted by myself in the absence of a radiologist:   [] Radiologist's Report Reviewed:  No orders to display         EKG (if obtained): (All EKG's are interpreted by myself in the absence of a cardiologist)    Chart review shows recent radiographs:  No results found. MDM:        No results found for this visit on 03/21/23. I estimate there is LOW risk for EPIGLOTTITIS, PNEUMONIA, MENINGITIS,  thus I consider the discharge disposition reasonable. Also, there is no evidence or peritonitis, sepsis, or toxicity. Devonte Schuler and I have discussed the diagnosis and risks, and we agree with discharging home to follow-up with their primary doctor. We also discussed returning to the Emergency Department immediately if new or worsening symptoms occur. We have discussed the symptoms which are most concerning (e.g., changing or worsening pain, trouble swallowing or breating, neck stiffness, fever) that necessitate immediate return. Final Impression    1. Viral URI with cough        Discharge Vital Signs:  Blood pressure 131/66, pulse 96, temperature 97.9 °F (36.6 °C), temperature source Oral, resp. rate 18, height 5' 4\" (1.626 m), weight 210 lb (95.3 kg), SpO2 100 %. ED Course and Summary:     History from : Patient    Limitations to history : None    Patient was given the following medications:  Medications - No data to display    Imaging Interpretation by Not indicated      Chronic conditions affecting care:  Anxiety; prediabetes     Discussion with Other Profesionals : None    Social

## 2023-04-19 ENCOUNTER — NURSE TRIAGE (OUTPATIENT)
Dept: OTHER | Facility: CLINIC | Age: 38
End: 2023-04-19

## 2023-04-19 NOTE — TELEPHONE ENCOUNTER
Location of patient: [address] call from [place] at [employer]. Subjective: Caller states \"I have incidents where I feel like I can't take a deep breath and I feel my heart fluttering in my chest. Like it is sputtering, taking a double beat or something. I've been vaping for five years. \"     Onset:  has been happening for years, very occasionally, but has been lasting for almost a week this time    Pain Severity:   chest feels tight and sometimes painful    Recommended disposition: Go to ED Now    Care advice provided, patient verbalizes understanding; denies any other questions or concerns; instructed to call back for any new or worsening symptoms. Go to the ED now. Attention Provider: Thank you for allowing me to participate in the care of your patient. The patient was connected to triage in response to information provided to the ECC/PSC. Please do not respond through this encounter as the response is not directed to a shared pool.       Reason for Disposition   Dizziness, lightheadedness, or weakness    Protocols used: Heart Rate and Heartbeat Questions-ADULT-

## 2023-05-08 ENCOUNTER — OFFICE VISIT (OUTPATIENT)
Dept: FAMILY MEDICINE CLINIC | Age: 38
End: 2023-05-08
Payer: COMMERCIAL

## 2023-05-08 VITALS
OXYGEN SATURATION: 98 % | WEIGHT: 214.4 LBS | SYSTOLIC BLOOD PRESSURE: 128 MMHG | DIASTOLIC BLOOD PRESSURE: 74 MMHG | BODY MASS INDEX: 36.8 KG/M2 | RESPIRATION RATE: 18 BRPM

## 2023-05-08 DIAGNOSIS — R07.9 CHEST PAIN, UNSPECIFIED TYPE: ICD-10-CM

## 2023-05-08 DIAGNOSIS — F41.9 ANXIETY: ICD-10-CM

## 2023-05-08 DIAGNOSIS — R00.2 PALPITATION: Primary | ICD-10-CM

## 2023-05-08 DIAGNOSIS — F33.2 SEVERE EPISODE OF RECURRENT MAJOR DEPRESSIVE DISORDER, WITHOUT PSYCHOTIC FEATURES (HCC): ICD-10-CM

## 2023-05-08 DIAGNOSIS — R79.89 ABNORMAL CBC: ICD-10-CM

## 2023-05-08 DIAGNOSIS — R73.03 PREDIABETES: ICD-10-CM

## 2023-05-08 LAB
BASOPHILS # BLD: 0 K/UL (ref 0–0.2)
BASOPHILS NFR BLD: 0.3 %
DEPRECATED RDW RBC AUTO: 13.7 % (ref 12.4–15.4)
EOSINOPHIL # BLD: 0.3 K/UL (ref 0–0.6)
EOSINOPHIL NFR BLD: 2.3 %
HCT VFR BLD AUTO: 41.4 % (ref 36–48)
HGB BLD-MCNC: 13.9 G/DL (ref 12–16)
LYMPHOCYTES # BLD: 2.5 K/UL (ref 1–5.1)
LYMPHOCYTES NFR BLD: 20.3 %
MCH RBC QN AUTO: 28.3 PG (ref 26–34)
MCHC RBC AUTO-ENTMCNC: 33.6 G/DL (ref 31–36)
MCV RBC AUTO: 84.3 FL (ref 80–100)
MONOCYTES # BLD: 0.9 K/UL (ref 0–1.3)
MONOCYTES NFR BLD: 7.5 %
NEUTROPHILS # BLD: 8.6 K/UL (ref 1.7–7.7)
NEUTROPHILS NFR BLD: 69.6 %
PLATELET # BLD AUTO: 351 K/UL (ref 135–450)
PMV BLD AUTO: 8.1 FL (ref 5–10.5)
RBC # BLD AUTO: 4.91 M/UL (ref 4–5.2)
WBC # BLD AUTO: 12.3 K/UL (ref 4–11)

## 2023-05-08 PROCEDURE — 36415 COLL VENOUS BLD VENIPUNCTURE: CPT | Performed by: NURSE PRACTITIONER

## 2023-05-08 PROCEDURE — 93000 ELECTROCARDIOGRAM COMPLETE: CPT | Performed by: NURSE PRACTITIONER

## 2023-05-08 PROCEDURE — G8427 DOCREV CUR MEDS BY ELIG CLIN: HCPCS | Performed by: NURSE PRACTITIONER

## 2023-05-08 PROCEDURE — 1036F TOBACCO NON-USER: CPT | Performed by: NURSE PRACTITIONER

## 2023-05-08 PROCEDURE — 99213 OFFICE O/P EST LOW 20 MIN: CPT | Performed by: NURSE PRACTITIONER

## 2023-05-08 PROCEDURE — G8417 CALC BMI ABV UP PARAM F/U: HCPCS | Performed by: NURSE PRACTITIONER

## 2023-05-08 RX ORDER — AMITRIPTYLINE HYDROCHLORIDE 50 MG/1
TABLET, FILM COATED ORAL
COMMUNITY
Start: 2023-05-08

## 2023-05-08 SDOH — ECONOMIC STABILITY: FOOD INSECURITY: WITHIN THE PAST 12 MONTHS, THE FOOD YOU BOUGHT JUST DIDN'T LAST AND YOU DIDN'T HAVE MONEY TO GET MORE.: OFTEN TRUE

## 2023-05-08 SDOH — ECONOMIC STABILITY: INCOME INSECURITY: HOW HARD IS IT FOR YOU TO PAY FOR THE VERY BASICS LIKE FOOD, HOUSING, MEDICAL CARE, AND HEATING?: VERY HARD

## 2023-05-08 SDOH — ECONOMIC STABILITY: HOUSING INSECURITY
IN THE LAST 12 MONTHS, WAS THERE A TIME WHEN YOU DID NOT HAVE A STEADY PLACE TO SLEEP OR SLEPT IN A SHELTER (INCLUDING NOW)?: YES

## 2023-05-08 SDOH — ECONOMIC STABILITY: FOOD INSECURITY: WITHIN THE PAST 12 MONTHS, YOU WORRIED THAT YOUR FOOD WOULD RUN OUT BEFORE YOU GOT MONEY TO BUY MORE.: OFTEN TRUE

## 2023-05-08 ASSESSMENT — PATIENT HEALTH QUESTIONNAIRE - PHQ9
9. THOUGHTS THAT YOU WOULD BE BETTER OFF DEAD, OR OF HURTING YOURSELF: 0
SUM OF ALL RESPONSES TO PHQ QUESTIONS 1-9: 19
6. FEELING BAD ABOUT YOURSELF - OR THAT YOU ARE A FAILURE OR HAVE LET YOURSELF OR YOUR FAMILY DOWN: 3
3. TROUBLE FALLING OR STAYING ASLEEP: 3
SUM OF ALL RESPONSES TO PHQ QUESTIONS 1-9: 19
8. MOVING OR SPEAKING SO SLOWLY THAT OTHER PEOPLE COULD HAVE NOTICED. OR THE OPPOSITE, BEING SO FIGETY OR RESTLESS THAT YOU HAVE BEEN MOVING AROUND A LOT MORE THAN USUAL: 0
5. POOR APPETITE OR OVEREATING: 3
SUM OF ALL RESPONSES TO PHQ QUESTIONS 1-9: 19
SUM OF ALL RESPONSES TO PHQ QUESTIONS 1-9: 19
SUM OF ALL RESPONSES TO PHQ9 QUESTIONS 1 & 2: 4
2. FEELING DOWN, DEPRESSED OR HOPELESS: 2
1. LITTLE INTEREST OR PLEASURE IN DOING THINGS: 2
4. FEELING TIRED OR HAVING LITTLE ENERGY: 3
7. TROUBLE CONCENTRATING ON THINGS, SUCH AS READING THE NEWSPAPER OR WATCHING TELEVISION: 3

## 2023-05-08 ASSESSMENT — ENCOUNTER SYMPTOMS
ABDOMINAL PAIN: 0
VOMITING: 0
SHORTNESS OF BREATH: 1
WHEEZING: 0
COUGH: 0
CONSTIPATION: 0
DIARRHEA: 0
NAUSEA: 0
CHEST TIGHTNESS: 0

## 2023-05-08 ASSESSMENT — ANXIETY QUESTIONNAIRES
GAD7 TOTAL SCORE: 12
3. WORRYING TOO MUCH ABOUT DIFFERENT THINGS: 2
6. BECOMING EASILY ANNOYED OR IRRITABLE: 0
5. BEING SO RESTLESS THAT IT IS HARD TO SIT STILL: 2
2. NOT BEING ABLE TO STOP OR CONTROL WORRYING: 2
7. FEELING AFRAID AS IF SOMETHING AWFUL MIGHT HAPPEN: 2
4. TROUBLE RELAXING: 2
1. FEELING NERVOUS, ANXIOUS, OR ON EDGE: 2

## 2023-05-08 NOTE — PATIENT INSTRUCTIONS
We are committed to providing you the best care possible. If you receive a survey after visiting one of our offices, please take time to share your experience concerning your physician office visit. These surveys are confidential and no health information about you is shared. We are eager to improve for you and continue to give you satisfactory care, we are counting on your feedback to help make that happen. Welcome to [facility]:    Did you know we now have a faster way for you to move through your appointment? For your convenience, we now have digital registration available. When you schedule your next appointment, you will receive a link via your email as well as a text message that will allow you to complete any paperwork digitally before your appointment. [address] (MARCIN/PIPP): What they offer: [address] assistance  Phone Numbers: [phone] or [phone]  Website: [email or web address]   [facility] Action:   Phone Numbers: 186.696.9575 or [phone]  [employer] (Cj/Nik/Shahla/Brandon): What they offer: free connection to hundreds of helpful area resources. When you call us, well arrange a one-on-one meeting in your home so we can create a personalized plan and find the assistance that you need. Phone Number: [phone]  Website: [email or web address]. [address] [place] [address] ([address]): What they offer: Temporary cash assistance to needy families. Phone Number: [phone]  Instructions: Email completed application to [email or web address]. [place].gov   Website: [employer]. org/food-assistance.html    Need additional resources? Call 211   Find Help https://www. findhelp.orgFOOD RESOURCES    Katie Mckeon):   What they offer:  Information on food pantries, mobile food pantries, summer meal

## 2023-05-08 NOTE — PROGRESS NOTES
and vomiting.   Musculoskeletal:  Negative for arthralgias.   Neurological:  Negative for weakness, numbness and headaches.   Hematological:  Negative for adenopathy.   Psychiatric/Behavioral:  Negative for suicidal ideas. The patient is nervous/anxious.      OBJECTIVE:    /74 (Site: Left Upper Arm, Position: Sitting, Cuff Size: Large Adult)   Resp 18   Wt 214 lb 6.4 oz (97.3 kg)   LMP 11/15/2022   SpO2 98%   BMI 36.80 kg/m²     Physical Exam  Constitutional:       Appearance: Normal appearance. She is well-developed.   HENT:      Head: Normocephalic and atraumatic.      Right Ear: Hearing normal.      Left Ear: Hearing normal.      Nose: Nose normal.      Mouth/Throat:      Mouth: Mucous membranes are moist.   Cardiovascular:      Rate and Rhythm: Normal rate and regular rhythm.      Heart sounds: Normal heart sounds. No murmur heard.    No friction rub. No gallop.   Pulmonary:      Effort: Pulmonary effort is normal.      Breath sounds: Normal breath sounds. No wheezing, rhonchi or rales.   Abdominal:      Palpations: Abdomen is soft.   Musculoskeletal:         General: Normal range of motion.      Cervical back: Normal range of motion and neck supple.   Skin:     General: Skin is warm and dry.   Neurological:      General: No focal deficit present.      Mental Status: She is alert and oriented to person, place, and time.   Psychiatric:         Mood and Affect: Mood is anxious and depressed.         Behavior: Behavior normal. Behavior is cooperative.         Thought Content: Thought content normal. Thought content does not include homicidal or suicidal ideation. Thought content does not include homicidal or suicidal plan.       ASSESSMENT/PLAN:    1. Palpitation  EKG reviewed. Referred to cardiology for possible holter, further evaluation  - EKG 12 Lead - Clinic Performed; Future  - EKG 12 Lead - Clinic Performed  - [facility]    2. Chest pain, unspecified type  See above  - [facility]

## 2023-05-10 DIAGNOSIS — D72.9 NEUTROPHILIA: Primary | ICD-10-CM

## 2023-05-10 NOTE — ASSESSMENT & PLAN NOTE
Complains of increased anxiety. She is followed by psychiatry and seeing a counselor. States that she was at work 1 day was busy. Could breathe, but felt like she could not get a deep breath. For like she was straining to get a deep breath. Feeling she had to yawn. She was feeling anxious at this time. States that this is happened on and off since she was 25. She did have some chest pain at the time. No wheezing. Did feel palpitations. She was seen at the ED and had an EKG and chest x-ray completed. FINDINGS:   The size and contour of the heart and mediastinum are normal.  The   lungs are clear     She has cut back on her caffeine intake.  Drinking pop once a week

## 2023-05-10 NOTE — ASSESSMENT & PLAN NOTE
Her next counseling appointment is tomorrow. She is also followed by psychiatry.   Denies SI.  PHQ Scores 5/8/2023 2/2/2023 12/1/2022 10/19/2022 9/15/2022   PHQ2 Score 4 6 3 6 0   PHQ9 Score 19 21 24 21 0     Interpretation of Total Score Depression Severity: 1-4 = Minimal depression, 5-9 = Mild depression, 10-14 = Moderate depression, 15-19 = Moderately severe depression, 20-27 = Severe depression

## 2023-05-10 NOTE — ASSESSMENT & PLAN NOTE
Exercising three days a week for approximately 30 minutes. Diet 'anything I can afford\". Admits to poor snacking choices.

## 2023-05-10 NOTE — ASSESSMENT & PLAN NOTE
Complains of increased appetite. 'always hungry'. Admits to snacking and 'ate a whole back of isidoro's pieces at work yesterday'.  She is exercising 3 days a week at the gym--typically elliptical.

## 2023-06-26 ENCOUNTER — HOSPITAL ENCOUNTER (OUTPATIENT)
Dept: INFUSION THERAPY | Age: 38
Discharge: HOME | End: 2023-06-26
Payer: COMMERCIAL

## 2023-06-26 ENCOUNTER — INITIAL CONSULT (OUTPATIENT)
Dept: ONCOLOGY | Age: 38
End: 2023-06-26
Payer: COMMERCIAL

## 2023-06-26 VITALS
DIASTOLIC BLOOD PRESSURE: 58 MMHG | WEIGHT: 211 LBS | OXYGEN SATURATION: 95 % | HEART RATE: 99 BPM | TEMPERATURE: 97.8 F | BODY MASS INDEX: 36.02 KG/M2 | SYSTOLIC BLOOD PRESSURE: 120 MMHG | HEIGHT: 64 IN

## 2023-06-26 DIAGNOSIS — D75.839 THROMBOCYTOSIS: ICD-10-CM

## 2023-06-26 DIAGNOSIS — D72.9 NEUTROPHILIA: Primary | ICD-10-CM

## 2023-06-26 DIAGNOSIS — D72.9 NEUTROPHILIA: ICD-10-CM

## 2023-06-26 LAB
ALBUMIN SERPL-MCNC: 4.2 GM/DL (ref 3.4–5)
ALP BLD-CCNC: 107 IU/L (ref 40–128)
ALT SERPL-CCNC: 30 U/L (ref 10–40)
ANION GAP SERPL CALCULATED.3IONS-SCNC: 10 MMOL/L (ref 4–16)
AST SERPL-CCNC: 21 IU/L (ref 15–37)
BASOPHILS ABSOLUTE: 0 K/CU MM
BASOPHILS RELATIVE PERCENT: 0.2 % (ref 0–1)
BILIRUB SERPL-MCNC: 0.2 MG/DL (ref 0–1)
BUN SERPL-MCNC: 12 MG/DL (ref 6–23)
CALCIUM SERPL-MCNC: 8.7 MG/DL (ref 8.3–10.6)
CHLORIDE BLD-SCNC: 102 MMOL/L (ref 99–110)
CO2: 27 MMOL/L (ref 21–32)
CREAT SERPL-MCNC: 0.7 MG/DL (ref 0.6–1.1)
DIFFERENTIAL TYPE: ABNORMAL
EOSINOPHILS ABSOLUTE: 0.3 K/CU MM
EOSINOPHILS RELATIVE PERCENT: 2.4 % (ref 0–3)
GFR SERPL CREATININE-BSD FRML MDRD: >60 ML/MIN/1.73M2
GLUCOSE SERPL-MCNC: 105 MG/DL (ref 70–99)
HCT VFR BLD CALC: 42.3 % (ref 37–47)
HEMOGLOBIN: 14.1 GM/DL (ref 12.5–16)
LACTATE DEHYDROGENASE: 168 IU/L (ref 120–246)
LYMPHOCYTES ABSOLUTE: 2 K/CU MM
LYMPHOCYTES RELATIVE PERCENT: 18 % (ref 24–44)
MCH RBC QN AUTO: 27.9 PG (ref 27–31)
MCHC RBC AUTO-ENTMCNC: 33.3 % (ref 32–36)
MCV RBC AUTO: 83.8 FL (ref 78–100)
MONOCYTES ABSOLUTE: 0.7 K/CU MM
MONOCYTES RELATIVE PERCENT: 6.7 % (ref 0–4)
PDW BLD-RTO: 14 % (ref 11.7–14.9)
PLATELET # BLD: 319 K/CU MM (ref 140–440)
PMV BLD AUTO: 9.6 FL (ref 7.5–11.1)
POTASSIUM SERPL-SCNC: 4.1 MMOL/L (ref 3.5–5.1)
RBC # BLD: 5.05 M/CU MM (ref 4.2–5.4)
SEGMENTED NEUTROPHILS ABSOLUTE COUNT: 8.1 K/CU MM
SEGMENTED NEUTROPHILS RELATIVE PERCENT: 72.7 % (ref 36–66)
SODIUM BLD-SCNC: 139 MMOL/L (ref 135–145)
TOTAL PROTEIN: 7.1 GM/DL (ref 6.4–8.2)
WBC # BLD: 11.1 K/CU MM (ref 4–10.5)

## 2023-06-26 PROCEDURE — 85025 COMPLETE CBC W/AUTO DIFF WBC: CPT

## 2023-06-26 PROCEDURE — 83615 LACTATE (LD) (LDH) ENZYME: CPT

## 2023-06-26 PROCEDURE — 80053 COMPREHEN METABOLIC PANEL: CPT

## 2023-06-26 PROCEDURE — 36415 COLL VENOUS BLD VENIPUNCTURE: CPT

## 2023-06-26 PROCEDURE — 4004F PT TOBACCO SCREEN RCVD TLK: CPT | Performed by: INTERNAL MEDICINE

## 2023-06-26 PROCEDURE — 99204 OFFICE O/P NEW MOD 45 MIN: CPT | Performed by: INTERNAL MEDICINE

## 2023-06-26 PROCEDURE — G8427 DOCREV CUR MEDS BY ELIG CLIN: HCPCS | Performed by: INTERNAL MEDICINE

## 2023-06-26 PROCEDURE — G8417 CALC BMI ABV UP PARAM F/U: HCPCS | Performed by: INTERNAL MEDICINE

## 2023-06-26 PROCEDURE — 99211 OFF/OP EST MAY X REQ PHY/QHP: CPT

## 2023-06-26 ASSESSMENT — PATIENT HEALTH QUESTIONNAIRE - PHQ9
SUM OF ALL RESPONSES TO PHQ QUESTIONS 1-9: 2
SUM OF ALL RESPONSES TO PHQ QUESTIONS 1-9: 2
2. FEELING DOWN, DEPRESSED OR HOPELESS: 2
SUM OF ALL RESPONSES TO PHQ QUESTIONS 1-9: 2
SUM OF ALL RESPONSES TO PHQ QUESTIONS 1-9: 2

## 2023-07-03 ENCOUNTER — INITIAL CONSULT (OUTPATIENT)
Dept: CARDIOLOGY CLINIC | Age: 38
End: 2023-07-03
Payer: COMMERCIAL

## 2023-07-03 ENCOUNTER — NURSE ONLY (OUTPATIENT)
Dept: CARDIOLOGY CLINIC | Age: 38
End: 2023-07-03

## 2023-07-03 VITALS
HEIGHT: 64 IN | WEIGHT: 210 LBS | SYSTOLIC BLOOD PRESSURE: 110 MMHG | OXYGEN SATURATION: 95 % | BODY MASS INDEX: 35.85 KG/M2 | HEART RATE: 110 BPM | DIASTOLIC BLOOD PRESSURE: 70 MMHG

## 2023-07-03 DIAGNOSIS — R00.2 PALPITATIONS: Primary | ICD-10-CM

## 2023-07-03 DIAGNOSIS — R00.2 PALPITATIONS: ICD-10-CM

## 2023-07-03 DIAGNOSIS — F41.9 ANXIETY: Primary | ICD-10-CM

## 2023-07-03 PROCEDURE — G8427 DOCREV CUR MEDS BY ELIG CLIN: HCPCS | Performed by: INTERNAL MEDICINE

## 2023-07-03 PROCEDURE — 93000 ELECTROCARDIOGRAM COMPLETE: CPT | Performed by: INTERNAL MEDICINE

## 2023-07-03 PROCEDURE — 4004F PT TOBACCO SCREEN RCVD TLK: CPT | Performed by: INTERNAL MEDICINE

## 2023-07-03 PROCEDURE — G8417 CALC BMI ABV UP PARAM F/U: HCPCS | Performed by: INTERNAL MEDICINE

## 2023-07-03 PROCEDURE — 99204 OFFICE O/P NEW MOD 45 MIN: CPT | Performed by: INTERNAL MEDICINE

## 2023-07-03 RX ORDER — HYDROXYZINE HYDROCHLORIDE 25 MG/1
25 TABLET, FILM COATED ORAL 3 TIMES DAILY PRN
COMMUNITY

## 2023-07-03 NOTE — PROGRESS NOTES
clinically stable. At present we will get an event monitor for 2 weeks and an echocardiogram.  I will see her back in 3 weeks to discuss the testing results and make further treatment plan. Thank you very much for consult and giving us the opportunity in contributing in the care of this patient. Please feel free to call me for any questions.        Joannie Goltz, MD, 7/3/2023 1:46 PM

## 2023-07-19 ENCOUNTER — TELEPHONE (OUTPATIENT)
Dept: CARDIOLOGY CLINIC | Age: 38
End: 2023-07-19

## 2023-07-19 NOTE — TELEPHONE ENCOUNTER
[facility] called and patient sent event monitor back early. I called pt and she stated she was allergic to the adhesive and was told by [facility] to send back that they had 2 weeks worth of information that it should be good. She stated she broke out from adhesive and had big scabs and redness where it was placed.

## 2023-07-25 ENCOUNTER — TELEPHONE (OUTPATIENT)
Dept: CARDIOLOGY CLINIC | Age: 38
End: 2023-07-25

## 2023-07-25 NOTE — TELEPHONE ENCOUNTER
Deborah's echo was denied by her insurance, they wanted to see the results from her monitor first. It's under a reconsideration with those results, but it's not yet approved. I called and left a message for her to call back to cancel her echo for now until we hear back from [facility].

## 2023-07-26 ENCOUNTER — TELEPHONE (OUTPATIENT)
Dept: CARDIOLOGY CLINIC | Age: 38
End: 2023-07-26

## 2023-07-26 NOTE — TELEPHONE ENCOUNTER
Patient called to reschedule Echo that was denied by insurance. I explained we were working with the insurance, trying to get it approved. Once we get a approval, we will call to reschedule. Patient voiced understanding.

## 2023-08-29 LAB
BCR/ABL T(9,22): NORMAL
CALR MUTATION: NORMAL
COMMENT: NORMAL
JAK2 EXONS 12-15 MUTATION: NORMAL
JAK2 P.V617F BLD/T QL: NORMAL
MPL 515 MUTATION: NORMAL